# Patient Record
Sex: FEMALE | Race: WHITE | NOT HISPANIC OR LATINO
[De-identification: names, ages, dates, MRNs, and addresses within clinical notes are randomized per-mention and may not be internally consistent; named-entity substitution may affect disease eponyms.]

---

## 2018-09-03 ENCOUNTER — CHARTING TRANS (OUTPATIENT)
Dept: OTHER | Age: 33
End: 2018-09-03

## 2018-09-03 ASSESSMENT — PAIN SCALES - GENERAL: PAINLEVEL_OUTOF10: 6

## 2018-09-04 ENCOUNTER — LAB SERVICES (OUTPATIENT)
Dept: OTHER | Age: 33
End: 2018-09-04

## 2018-09-07 LAB — BACTERIA UR CULT: NORMAL

## 2018-09-17 ENCOUNTER — LAB SERVICES (OUTPATIENT)
Dept: OTHER | Age: 33
End: 2018-09-17

## 2018-09-17 ENCOUNTER — CHARTING TRANS (OUTPATIENT)
Dept: OTHER | Age: 33
End: 2018-09-17

## 2018-09-17 ENCOUNTER — MYAURORA ACCOUNT LINK (OUTPATIENT)
Dept: OTHER | Age: 33
End: 2018-09-17

## 2018-09-19 ENCOUNTER — CHARTING TRANS (OUTPATIENT)
Dept: OTHER | Age: 33
End: 2018-09-19

## 2018-09-19 LAB
AMPHETAMINES UR QL SCN>500 NG/ML: NEGATIVE
BARBITURATES UR QL SCN>200 NG/ML: NEGATIVE
BENZODIAZ UR QL SCN>200 NG/ML: NEGATIVE
BZE UR QL SCN>150 NG/ML: NEGATIVE
CANNABINOIDS UR QL SCN>50 NG/ML: NEGATIVE
OPIATES UR QL SCN>300 NG/ML: NEGATIVE
PCP UR QL SCN>25 NG/ML: NEGATIVE

## 2018-09-20 ENCOUNTER — CHARTING TRANS (OUTPATIENT)
Dept: OTHER | Age: 33
End: 2018-09-20

## 2018-09-20 LAB
25(OH)D3+25(OH)D2 SERPL-MCNC: 20.3 NG/ML (ref 30–100)
ALBUMIN SERPL-MCNC: 4 G/DL (ref 3.6–5.1)
ALBUMIN/GLOB SERPL: 1.4 (ref 1–2.4)
ALP SERPL-CCNC: 55 UNITS/L (ref 45–117)
ALT SERPL-CCNC: 19 UNITS/L
ANION GAP SERPL CALC-SCNC: 12 MMOL/L (ref 10–20)
ANNOTATION COMMENT IMP: NORMAL
AST SERPL-CCNC: 17 UNITS/L
BASOPHILS # BLD: 0 K/MCL (ref 0–0.3)
BASOPHILS NFR BLD: 1 %
BILIRUB SERPL-MCNC: 0.4 MG/DL (ref 0.2–1)
BUN SERPL-MCNC: 12 MG/DL (ref 6–20)
BUN/CREAT SERPL: 21 (ref 7–25)
CALCIUM SERPL-MCNC: 9.1 MG/DL (ref 8.4–10.2)
CHLORIDE SERPL-SCNC: 110 MMOL/L (ref 98–107)
CHOLEST SERPL-MCNC: 101 MG/DL
CHOLEST/HDLC SERPL: 1.6
CO2 SERPL-SCNC: 24 MMOL/L (ref 21–32)
CREAT SERPL-MCNC: 0.58 MG/DL (ref 0.51–0.95)
DIFFERENTIAL METHOD BLD: ABNORMAL
EOSINOPHIL # BLD: 0.1 K/MCL (ref 0.1–0.5)
EOSINOPHIL NFR BLD: 2 %
ERYTHROCYTE [DISTWIDTH] IN BLOOD: 12.8 % (ref 11–15)
FOLATE SERPL-MCNC: 17.5 NG/ML
GLOBULIN SER-MCNC: 2.9 G/DL (ref 2–4)
GLUCOSE SERPL-MCNC: 73 MG/DL (ref 65–99)
HBA1C MFR BLD: 5 % (ref 4.5–5.6)
HBV CORE IGG+IGM SER QL: NEGATIVE
HBV SURFACE AB SER QL: NEGATIVE
HBV SURFACE AG SER QL: NEGATIVE
HCV AB SER QL: NEGATIVE
HDLC SERPL-MCNC: 62 MG/DL
HEMATOCRIT: 41.2 % (ref 36–46.5)
HEMOGLOBIN: 12.6 G/DL (ref 12–15.5)
IMM GRANULOCYTES # BLD AUTO: 0 K/MCL (ref 0–0.2)
IMM GRANULOCYTES NFR BLD: 0 %
LDLC SERPL CALC-MCNC: 29 MG/DL
LENGTH OF FAST TIME PATIENT: ABNORMAL HRS
LENGTH OF FAST TIME PATIENT: ABNORMAL HRS
LYMPHOCYTES # BLD: 1.9 K/MCL (ref 1–4.8)
LYMPHOCYTES NFR BLD: 36 %
MAGNESIUM SERPL-MCNC: 2.3 MG/DL (ref 1.7–2.4)
MEAN CORPUSCULAR HEMOGLOBIN: 29.6 PG (ref 26–34)
MEAN CORPUSCULAR HGB CONC: 30.6 G/DL (ref 32–36.5)
MEAN CORPUSCULAR VOLUME: 96.9 FL (ref 78–100)
MONOCYTES # BLD: 0.4 K/MCL (ref 0.3–0.9)
MONOCYTES NFR BLD: 7 %
NEUTROPHILS # BLD: 3 K/MCL (ref 1.8–7.7)
NEUTROPHILS NFR BLD: 54 %
NONHDLC SERPL-MCNC: 39 MG/DL
NRBC (NRBCRE): 0 /100 WBC
PLATELET COUNT: 217 K/MCL (ref 140–450)
POTASSIUM SERPL-SCNC: 4.7 MMOL/L (ref 3.4–5.1)
RED CELL COUNT: 4.25 MIL/MCL (ref 4–5.2)
SODIUM SERPL-SCNC: 141 MMOL/L (ref 135–145)
TOTAL PROTEIN: 6.9 G/DL (ref 6.4–8.2)
TRIGL SERPL-MCNC: 48 MG/DL
TSH SERPL-ACNC: 1.99 MCUNITS/ML (ref 0.35–5)
VIT B12 SERPL-MCNC: 452 PG/ML (ref 211–911)
WHITE BLOOD COUNT: 5.4 K/MCL (ref 4.2–11)

## 2018-10-02 ENCOUNTER — LAB SERVICES (OUTPATIENT)
Dept: OTHER | Age: 33
End: 2018-10-02

## 2018-10-02 ENCOUNTER — BH HISTORICAL (OUTPATIENT)
Dept: OTHER | Age: 33
End: 2018-10-02

## 2018-10-02 ENCOUNTER — CHARTING TRANS (OUTPATIENT)
Dept: OTHER | Age: 33
End: 2018-10-02

## 2018-10-04 LAB — LITHIUM SERPL-SCNC: 0.2 MMOL/L (ref 0.6–1.2)

## 2018-10-15 ENCOUNTER — MYAURORA ACCOUNT LINK (OUTPATIENT)
Dept: OTHER | Age: 33
End: 2018-10-15

## 2018-10-15 ENCOUNTER — BH HISTORICAL (OUTPATIENT)
Dept: OTHER | Age: 33
End: 2018-10-15

## 2018-11-18 RX ORDER — SERTRALINE HYDROCHLORIDE 100 MG/1
TABLET, FILM COATED ORAL
COMMUNITY
End: 2019-08-27 | Stop reason: ALTCHOICE

## 2018-11-18 RX ORDER — LEVOTHYROXINE SODIUM 0.03 MG/1
TABLET ORAL
COMMUNITY
End: 2019-08-27 | Stop reason: ALTCHOICE

## 2018-11-27 VITALS
SYSTOLIC BLOOD PRESSURE: 100 MMHG | WEIGHT: 121.92 LBS | TEMPERATURE: 98.4 F | BODY MASS INDEX: 19.59 KG/M2 | DIASTOLIC BLOOD PRESSURE: 65 MMHG | HEART RATE: 62 BPM | RESPIRATION RATE: 20 BRPM | HEIGHT: 66 IN

## 2018-11-27 VITALS
SYSTOLIC BLOOD PRESSURE: 104 MMHG | HEIGHT: 66 IN | WEIGHT: 124 LBS | BODY MASS INDEX: 19.93 KG/M2 | DIASTOLIC BLOOD PRESSURE: 62 MMHG

## 2018-11-27 VITALS
OXYGEN SATURATION: 99 % | BODY MASS INDEX: 20.04 KG/M2 | WEIGHT: 124.67 LBS | SYSTOLIC BLOOD PRESSURE: 104 MMHG | HEART RATE: 86 BPM | DIASTOLIC BLOOD PRESSURE: 64 MMHG | HEIGHT: 66 IN

## 2018-12-03 ENCOUNTER — BEHAVIORAL HEALTH (OUTPATIENT)
Dept: BEHAVIORAL HEALTH | Age: 33
End: 2018-12-03

## 2018-12-03 DIAGNOSIS — F31.81 BIPOLAR II DISORDER (CMD): Primary | ICD-10-CM

## 2018-12-03 PROCEDURE — 90791 PSYCH DIAGNOSTIC EVALUATION: CPT | Performed by: PSYCHOLOGIST

## 2018-12-04 SDOH — SOCIAL STABILITY: SOCIAL INSECURITY: WITHIN THE LAST YEAR, HAVE YOU BEEN HUMILIATED OR EMOTIONALLY ABUSED IN OTHER WAYS BY YOUR PARTNER OR EX-PARTNER?: NO

## 2018-12-04 SDOH — SOCIAL STABILITY: SOCIAL INSECURITY
WITHIN THE LAST YEAR, HAVE TO BEEN RAPED OR FORCED TO HAVE ANY KIND OF SEXUAL ACTIVITY BY YOUR PARTNER OR EX-PARTNER?: NO

## 2018-12-04 SDOH — SOCIAL STABILITY: SOCIAL INSECURITY
WITHIN THE LAST YEAR, HAVE YOU BEEN KICKED, HIT, SLAPPED, OR OTHERWISE PHYSICALLY HURT BY YOUR PARTNER OR EX-PARTNER?: NO

## 2018-12-04 SDOH — SOCIAL STABILITY: SOCIAL INSECURITY: WITHIN THE LAST YEAR, HAVE YOU BEEN AFRAID OF YOUR PARTNER OR EX-PARTNER?: NO

## 2018-12-04 SDOH — SOCIAL STABILITY: SOCIAL NETWORK: ARE YOU MARRIED, WIDOWED, DIVORCED, SEPARATED, NEVER MARRIED, OR LIVING WITH A PARTNER?: MARRIED

## 2018-12-04 ASSESSMENT — PATIENT HEALTH QUESTIONNAIRE - PHQ9
2. FEELING DOWN, DEPRESSED OR HOPELESS: SEVERAL DAYS
5. POOR APPETITE, WEIGHT LOSS, OR OVEREATING: SEVERAL DAYS
1. LITTLE INTEREST OR PLEASURE IN DOING THINGS: SEVERAL DAYS
8. MOVING OR SPEAKING SO SLOWLY THAT OTHER PEOPLE COULD HAVE NOTICED. OR THE OPPOSITE, BEING SO FIGETY OR RESTLESS THAT YOU HAVE BEEN MOVING AROUND A LOT MORE THAN USUAL: NOT AT ALL
7. TROUBLE CONCENTRATING ON THINGS, SUCH AS READING THE NEWSPAPER OR WATCHING TELEVISION: SEVERAL DAYS
SUM OF ALL RESPONSES TO PHQ9 QUESTIONS 1 AND 2: 2
6. FEELING BAD ABOUT YOURSELF - OR THAT YOU ARE A FAILURE OR HAVE LET YOURSELF OR YOUR FAMILY DOWN: MORE THAN HALF THE DAYS
SUM OF ALL RESPONSES TO PHQ QUESTIONS 1-9: 9
9. THOUGHTS THAT YOU WOULD BE BETTER OFF DEAD, OR OF HURTING YOURSELF: SEVERAL DAYS
CLINICAL INTERPRETATION OF PHQ9 SCORE: MILD DEPRESSION
10. IF YOU CHECKED OFF ANY PROBLEMS, HOW DIFFICULT HAVE THESE PROBLEMS MADE IT FOR YOU TO DO YOUR WORK, TAKE CARE OF THINGS AT HOME, OR GET ALONG WITH OTHER PEOPLE: SOMEWHAT DIFFICULT
3. TROUBLE FALLING OR STAYING ASLEEP OR SLEEPING TOO MUCH: SEVERAL DAYS
4. FEELING TIRED OR HAVING LITTLE ENERGY: SEVERAL DAYS

## 2018-12-04 ASSESSMENT — ANXIETY QUESTIONNAIRES
4. TROUBLE RELAXING: 2
6. BECOMING EASILY ANNOYED OR IRRITABLE: 1
1. FEELING NERVOUS, ANXIOUS, OR ON EDGE: 3
6. BECOMING EASILY ANNOYED OR IRRITABLE: SEVERAL DAYS
1. FEELING NERVOUS, ANXIOUS, OR ON EDGE: NEARLY EVERY DAY
3. WORRYING TOO MUCH ABOUT DIFFERENT THINGS: MORE THAN HALF THE DAYS
GAD7 TOTAL SCORE: 11
7. FEELING AFRAID AS IF SOMETHING AWFUL MIGHT HAPPEN: SEVERAL DAYS
2. NOT BEING ABLE TO STOP OR CONTROL WORRYING: 2
5. BEING SO RESTLESS THAT IT IS HARD TO SIT STILL: 0
7. FEELING AFRAID AS IF SOMETHING AWFUL MIGHT HAPPEN: 1
2. NOT BEING ABLE TO STOP OR CONTROL WORRYING: MORE THAN HALF THE DAYS
5. BEING SO RESTLESS THAT IT IS HARD TO SIT STILL: NOT AT ALL
3. WORRYING TOO MUCH ABOUT DIFFERENT THINGS: 2
4. TROUBLE RELAXING: MORE THAN HALF THE DAYS

## 2018-12-25 PROBLEM — F31.9 BIPOLAR DEPRESSION (CMD): Status: ACTIVE | Noted: 2018-09-17

## 2018-12-25 PROBLEM — F12.90 MARIJUANA USE: Status: ACTIVE | Noted: 2018-09-17

## 2018-12-25 PROBLEM — F14.11 COCAINE ABUSE IN REMISSION (CMD): Status: ACTIVE | Noted: 2018-09-17

## 2018-12-27 ENCOUNTER — OFFICE VISIT (OUTPATIENT)
Dept: OBGYN | Age: 33
End: 2018-12-27

## 2018-12-27 DIAGNOSIS — Z11.3 SCREEN FOR STD (SEXUALLY TRANSMITTED DISEASE): ICD-10-CM

## 2018-12-27 DIAGNOSIS — Z30.432 ENCOUNTER FOR IUD REMOVAL: ICD-10-CM

## 2018-12-27 DIAGNOSIS — Z01.419 ENCOUNTER FOR CERVICAL PAP SMEAR WITH PELVIC EXAM: Primary | ICD-10-CM

## 2018-12-27 DIAGNOSIS — Z31.69 ENCOUNTER FOR PRECONCEPTION CONSULTATION: ICD-10-CM

## 2018-12-27 PROCEDURE — 87491 CHLMYD TRACH DNA AMP PROBE: CPT | Performed by: OBSTETRICS & GYNECOLOGY

## 2018-12-27 PROCEDURE — 99385 PREV VISIT NEW AGE 18-39: CPT | Performed by: OBSTETRICS & GYNECOLOGY

## 2018-12-27 PROCEDURE — 87591 N.GONORRHOEAE DNA AMP PROB: CPT | Performed by: OBSTETRICS & GYNECOLOGY

## 2018-12-27 PROCEDURE — 58301 REMOVE INTRAUTERINE DEVICE: CPT | Performed by: OBSTETRICS & GYNECOLOGY

## 2018-12-27 RX ORDER — LEVOTHYROXINE SODIUM 0.03 MG/1
TABLET ORAL
COMMUNITY
Start: 2018-04-19 | End: 2019-08-27 | Stop reason: ALTCHOICE

## 2018-12-27 SDOH — ECONOMIC STABILITY: FOOD INSECURITY: WITHIN THE PAST 12 MONTHS, YOU WORRIED THAT YOUR FOOD WOULD RUN OUT BEFORE YOU GOT MONEY TO BUY MORE.: NEVER TRUE

## 2018-12-27 SDOH — ECONOMIC STABILITY: TRANSPORTATION INSECURITY
IN THE PAST 12 MONTHS, HAS LACK OF TRANSPORTATION KEPT YOU FROM MEETINGS, WORK, OR FROM GETTING THINGS NEEDED FOR DAILY LIVING?: NO

## 2018-12-27 SDOH — ECONOMIC STABILITY: FOOD INSECURITY: WITHIN THE PAST 12 MONTHS, THE FOOD YOU BOUGHT JUST DIDN'T LAST AND YOU DIDN'T HAVE MONEY TO GET MORE.: NEVER TRUE

## 2018-12-27 SDOH — ECONOMIC STABILITY: INCOME INSECURITY: HOW HARD IS IT FOR YOU TO PAY FOR THE VERY BASICS LIKE FOOD, HOUSING, MEDICAL CARE, AND HEATING?: NOT HARD AT ALL

## 2018-12-27 SDOH — ECONOMIC STABILITY: TRANSPORTATION INSECURITY
IN THE PAST 12 MONTHS, HAS THE LACK OF TRANSPORTATION KEPT YOU FROM MEDICAL APPOINTMENTS OR FROM GETTING MEDICATIONS?: NO

## 2018-12-27 ASSESSMENT — PATIENT HEALTH QUESTIONNAIRE - PHQ9
1. LITTLE INTEREST OR PLEASURE IN DOING THINGS: SEVERAL DAYS
SUM OF ALL RESPONSES TO PHQ9 QUESTIONS 1 AND 2: 2
2. FEELING DOWN, DEPRESSED OR HOPELESS: SEVERAL DAYS

## 2018-12-27 ASSESSMENT — PAIN SCALES - GENERAL: PAINLEVEL: 0

## 2018-12-31 LAB
C TRACH RRNA SPEC QL NAA+PROBE: NEGATIVE
N GONORRHOEA RRNA SPEC QL NAA+PROBE: NEGATIVE
SPECIMEN SOURCE: NORMAL

## 2019-01-04 LAB — HPV16+18+45 E6+E7MRNA CVX NAA+PROBE: NORMAL

## 2019-01-22 ENCOUNTER — APPOINTMENT (OUTPATIENT)
Dept: BEHAVIORAL HEALTH | Age: 34
End: 2019-01-22

## 2019-02-07 ENCOUNTER — BEHAVIORAL HEALTH (OUTPATIENT)
Dept: BEHAVIORAL HEALTH | Age: 34
End: 2019-02-07

## 2019-02-07 DIAGNOSIS — F31.81 BIPOLAR II DISORDER (CMD): Primary | ICD-10-CM

## 2019-02-07 PROCEDURE — 90837 PSYTX W PT 60 MINUTES: CPT | Performed by: PSYCHOLOGIST

## 2019-03-01 ENCOUNTER — BEHAVIORAL HEALTH (OUTPATIENT)
Dept: BEHAVIORAL HEALTH | Age: 34
End: 2019-03-01

## 2019-03-01 DIAGNOSIS — F31.81 BIPOLAR II DISORDER (CMD): Primary | ICD-10-CM

## 2019-03-01 PROCEDURE — 90837 PSYTX W PT 60 MINUTES: CPT | Performed by: PSYCHOLOGIST

## 2019-03-11 ENCOUNTER — APPOINTMENT (OUTPATIENT)
Dept: BEHAVIORAL HEALTH | Age: 34
End: 2019-03-11

## 2019-03-19 ENCOUNTER — TELEPHONE (OUTPATIENT)
Dept: OBGYN | Age: 34
End: 2019-03-19

## 2019-03-19 DIAGNOSIS — Z31.69 ENCOUNTER FOR PRECONCEPTION CONSULTATION: Primary | ICD-10-CM

## 2019-03-28 ENCOUNTER — APPOINTMENT (OUTPATIENT)
Dept: BEHAVIORAL HEALTH | Age: 34
End: 2019-03-28

## 2019-04-11 ENCOUNTER — BEHAVIORAL HEALTH (OUTPATIENT)
Dept: BEHAVIORAL HEALTH | Age: 34
End: 2019-04-11

## 2019-04-11 ENCOUNTER — LAB SERVICES (OUTPATIENT)
Dept: LAB | Age: 34
End: 2019-04-11

## 2019-04-11 DIAGNOSIS — Z31.69 ENCOUNTER FOR PRECONCEPTION CONSULTATION: ICD-10-CM

## 2019-04-11 DIAGNOSIS — F31.81 BIPOLAR II DISORDER (CMD): Primary | ICD-10-CM

## 2019-04-11 PROCEDURE — 87389 HIV-1 AG W/HIV-1&-2 AB AG IA: CPT | Performed by: OBSTETRICS & GYNECOLOGY

## 2019-04-11 PROCEDURE — 86592 SYPHILIS TEST NON-TREP QUAL: CPT | Performed by: OBSTETRICS & GYNECOLOGY

## 2019-04-11 PROCEDURE — 90837 PSYTX W PT 60 MINUTES: CPT | Performed by: PSYCHOLOGIST

## 2019-04-11 PROCEDURE — 36415 COLL VENOUS BLD VENIPUNCTURE: CPT | Performed by: OBSTETRICS & GYNECOLOGY

## 2019-04-11 PROCEDURE — 86803 HEPATITIS C AB TEST: CPT | Performed by: OBSTETRICS & GYNECOLOGY

## 2019-04-11 PROCEDURE — 87340 HEPATITIS B SURFACE AG IA: CPT | Performed by: OBSTETRICS & GYNECOLOGY

## 2019-04-12 LAB
HBV SURFACE AG SER QL: NEGATIVE
HCV AB SER QL: NEGATIVE
HIV 1+2 AB+HIV1 P24 AG SERPL QL IA: NONREACTIVE
RPR SER QL: NONREACTIVE

## 2019-05-07 ENCOUNTER — OFFICE VISIT (OUTPATIENT)
Dept: OBGYN | Age: 34
End: 2019-05-07

## 2019-05-07 DIAGNOSIS — E03.2 HYPOTHYROIDISM, LITHIUM INDUCED: ICD-10-CM

## 2019-05-07 DIAGNOSIS — N94.6 DYSMENORRHEA: Primary | ICD-10-CM

## 2019-05-07 DIAGNOSIS — T56.891A HYPOTHYROIDISM, LITHIUM INDUCED: ICD-10-CM

## 2019-05-07 PROCEDURE — 99213 OFFICE O/P EST LOW 20 MIN: CPT | Performed by: OBSTETRICS & GYNECOLOGY

## 2019-05-07 RX ORDER — IBUPROFEN 600 MG/1
600 TABLET ORAL EVERY 6 HOURS PRN
Qty: 30 TABLET | Refills: 0 | Status: SHIPPED | OUTPATIENT
Start: 2019-05-07 | End: 2019-08-27 | Stop reason: ALTCHOICE

## 2019-05-07 ASSESSMENT — ENCOUNTER SYMPTOMS
AGITATION: 0
EYE PAIN: 0
CHILLS: 1
DIARRHEA: 1
VOMITING: 0
CONSTIPATION: 0
SORE THROAT: 0
RHINORRHEA: 0
NAUSEA: 0
ADENOPATHY: 0
COUGH: 0
BRUISES/BLEEDS EASILY: 0
BACK PAIN: 0
SHORTNESS OF BREATH: 0
ABDOMINAL PAIN: 1
HALLUCINATIONS: 0
WHEEZING: 0
FEVER: 0
CONFUSION: 0
UNEXPECTED WEIGHT CHANGE: 0

## 2019-05-07 ASSESSMENT — PAIN SCALES - GENERAL: PAINLEVEL: 0

## 2019-05-23 ENCOUNTER — LAB SERVICES (OUTPATIENT)
Dept: LAB | Age: 34
End: 2019-05-23

## 2019-05-23 ENCOUNTER — BEHAVIORAL HEALTH (OUTPATIENT)
Dept: BEHAVIORAL HEALTH | Age: 34
End: 2019-05-23

## 2019-05-23 DIAGNOSIS — F31.81 BIPOLAR II DISORDER (CMD): Primary | ICD-10-CM

## 2019-05-23 LAB — TSH SERPL-ACNC: 1.35 MCUNITS/ML (ref 0.35–5)

## 2019-05-23 PROCEDURE — 36415 COLL VENOUS BLD VENIPUNCTURE: CPT | Performed by: OBSTETRICS & GYNECOLOGY

## 2019-05-23 PROCEDURE — 90837 PSYTX W PT 60 MINUTES: CPT | Performed by: PSYCHOLOGIST

## 2019-05-23 PROCEDURE — 84443 ASSAY THYROID STIM HORMONE: CPT | Performed by: OBSTETRICS & GYNECOLOGY

## 2019-05-31 ENCOUNTER — TELEPHONE (OUTPATIENT)
Dept: BEHAVIORAL HEALTH | Age: 34
End: 2019-05-31

## 2019-06-06 ENCOUNTER — BEHAVIORAL HEALTH (OUTPATIENT)
Dept: BEHAVIORAL HEALTH | Age: 34
End: 2019-06-06

## 2019-06-06 DIAGNOSIS — F40.10 SOCIAL ANXIETY DISORDER: ICD-10-CM

## 2019-06-06 DIAGNOSIS — F31.81 BIPOLAR II DISORDER (CMD): Primary | ICD-10-CM

## 2019-06-06 PROCEDURE — 90837 PSYTX W PT 60 MINUTES: CPT | Performed by: PSYCHOLOGIST

## 2019-06-24 ENCOUNTER — E-ADVICE (OUTPATIENT)
Dept: OBGYN | Age: 34
End: 2019-06-24

## 2019-07-15 ENCOUNTER — APPOINTMENT (OUTPATIENT)
Dept: BEHAVIORAL HEALTH | Age: 34
End: 2019-07-15

## 2019-08-05 ENCOUNTER — BEHAVIORAL HEALTH (OUTPATIENT)
Dept: BEHAVIORAL HEALTH | Age: 34
End: 2019-08-05

## 2019-08-05 DIAGNOSIS — F40.10 SOCIAL ANXIETY DISORDER: ICD-10-CM

## 2019-08-05 DIAGNOSIS — F31.81 BIPOLAR II DISORDER (CMD): Primary | ICD-10-CM

## 2019-08-05 PROCEDURE — 90837 PSYTX W PT 60 MINUTES: CPT | Performed by: PSYCHOLOGIST

## 2019-08-06 ENCOUNTER — APPOINTMENT (OUTPATIENT)
Dept: OBGYN | Age: 34
End: 2019-08-06

## 2019-08-26 ENCOUNTER — BEHAVIORAL HEALTH (OUTPATIENT)
Dept: BEHAVIORAL HEALTH | Age: 34
End: 2019-08-26

## 2019-08-26 DIAGNOSIS — F31.81 BIPOLAR II DISORDER (CMD): Primary | ICD-10-CM

## 2019-08-26 PROCEDURE — 90834 PSYTX W PT 45 MINUTES: CPT | Performed by: PSYCHOLOGIST

## 2019-08-27 ENCOUNTER — OFFICE VISIT (OUTPATIENT)
Dept: OBGYN | Age: 34
End: 2019-08-27

## 2019-08-27 ENCOUNTER — LAB SERVICES (OUTPATIENT)
Dept: LAB | Age: 34
End: 2019-08-27

## 2019-08-27 DIAGNOSIS — O21.9 NAUSEA AND VOMITING OF PREGNANCY, ANTEPARTUM: Primary | ICD-10-CM

## 2019-08-27 DIAGNOSIS — E03.2 HYPOTHYROIDISM, LITHIUM INDUCED: ICD-10-CM

## 2019-08-27 DIAGNOSIS — T56.891A HYPOTHYROIDISM, LITHIUM INDUCED: ICD-10-CM

## 2019-08-27 PROBLEM — F12.90 MARIJUANA USE: Status: RESOLVED | Noted: 2018-09-17 | Resolved: 2019-08-27

## 2019-08-27 PROBLEM — F14.11 COCAINE ABUSE IN REMISSION (CMD): Status: RESOLVED | Noted: 2018-09-17 | Resolved: 2019-08-27

## 2019-08-27 PROBLEM — N94.6 DYSMENORRHEA: Status: RESOLVED | Noted: 2019-05-07 | Resolved: 2019-08-27

## 2019-08-27 LAB
T4 FREE SERPL-MCNC: 1 NG/DL (ref 0.8–1.5)
TSH SERPL-ACNC: 1.07 MCUNITS/ML (ref 0.35–5)

## 2019-08-27 PROCEDURE — 84439 ASSAY OF FREE THYROXINE: CPT | Performed by: OBSTETRICS & GYNECOLOGY

## 2019-08-27 PROCEDURE — 99213 OFFICE O/P EST LOW 20 MIN: CPT | Performed by: OBSTETRICS & GYNECOLOGY

## 2019-08-27 PROCEDURE — 36415 COLL VENOUS BLD VENIPUNCTURE: CPT | Performed by: OBSTETRICS & GYNECOLOGY

## 2019-08-27 PROCEDURE — 84443 ASSAY THYROID STIM HORMONE: CPT | Performed by: OBSTETRICS & GYNECOLOGY

## 2019-08-27 RX ORDER — PYRIDOXINE HCL (VITAMIN B6) 25 MG
25 TABLET ORAL EVERY 6 HOURS
Qty: 180 TABLET | Refills: 1 | Status: SHIPPED | OUTPATIENT
Start: 2019-08-27 | End: 2022-12-20 | Stop reason: ALTCHOICE

## 2019-08-27 ASSESSMENT — PAIN SCALES - GENERAL: PAINLEVEL: 0

## 2019-08-28 ENCOUNTER — TELEPHONE (OUTPATIENT)
Dept: SCHEDULING | Age: 34
End: 2019-08-28

## 2019-09-10 ENCOUNTER — APPOINTMENT (OUTPATIENT)
Dept: BEHAVIORAL HEALTH | Age: 34
End: 2019-09-10

## 2019-09-24 ENCOUNTER — APPOINTMENT (OUTPATIENT)
Dept: BEHAVIORAL HEALTH | Age: 34
End: 2019-09-24

## 2021-05-26 VITALS
SYSTOLIC BLOOD PRESSURE: 106 MMHG | BODY MASS INDEX: 19.49 KG/M2 | WEIGHT: 123.46 LBS | SYSTOLIC BLOOD PRESSURE: 110 MMHG | HEIGHT: 66 IN | TEMPERATURE: 98 F | SYSTOLIC BLOOD PRESSURE: 94 MMHG | DIASTOLIC BLOOD PRESSURE: 64 MMHG | DIASTOLIC BLOOD PRESSURE: 58 MMHG | HEIGHT: 66 IN | BODY MASS INDEX: 19.84 KG/M2 | WEIGHT: 121.25 LBS | HEART RATE: 95 BPM | HEART RATE: 76 BPM | DIASTOLIC BLOOD PRESSURE: 65 MMHG | HEIGHT: 66 IN | TEMPERATURE: 98.2 F | HEART RATE: 104 BPM | BODY MASS INDEX: 19.57 KG/M2

## 2021-12-16 ENCOUNTER — LAB REQUISITION (OUTPATIENT)
Dept: LAB | Age: 36
End: 2021-12-16

## 2021-12-16 DIAGNOSIS — Z00.00 ENCOUNTER FOR GENERAL ADULT MEDICAL EXAMINATION WITHOUT ABNORMAL FINDINGS: ICD-10-CM

## 2021-12-16 PROCEDURE — 86787 VARICELLA-ZOSTER ANTIBODY: CPT | Performed by: CLINICAL MEDICAL LABORATORY

## 2021-12-16 PROCEDURE — PSEU8106 HEPATITIS B SURFACE ANTIBODY, QUANTITATIVE: Performed by: CLINICAL MEDICAL LABORATORY

## 2021-12-16 PROCEDURE — PSEU8230 VARICELLA ZOSTER IMMUNITY IGG: Performed by: CLINICAL MEDICAL LABORATORY

## 2021-12-16 PROCEDURE — 86480 TB TEST CELL IMMUN MEASURE: CPT | Performed by: CLINICAL MEDICAL LABORATORY

## 2021-12-16 PROCEDURE — PSEU9049 QUANTIFERON TB PLUS: Performed by: CLINICAL MEDICAL LABORATORY

## 2021-12-16 PROCEDURE — 86706 HEP B SURFACE ANTIBODY: CPT | Performed by: CLINICAL MEDICAL LABORATORY

## 2021-12-17 LAB — HBV SURFACE AB SER-ACNC: <3.1 MUNITS/ML

## 2021-12-18 LAB
GAMMA INTERFERON BACKGROUND BLD IA-ACNC: 0.02 IU/ML
M TB IFN-G BLD-IMP: NEGATIVE
M TB IFN-G CD4+ BCKGRND COR BLD-ACNC: 0.01 IU/ML
M TB IFN-G CD4+CD8+ BCKGRND COR BLD-ACNC: 0 IU/ML
MITOGEN IGNF BCKGRD COR BLD-ACNC: >10 IU/ML

## 2021-12-20 LAB — VZV IGG SER IA-ACNC: NORMAL

## 2021-12-29 ENCOUNTER — TELEPHONE (OUTPATIENT)
Dept: SCHEDULING | Age: 36
End: 2021-12-29

## 2022-05-11 ENCOUNTER — EMPLOYEE HEALTH (OUTPATIENT)
Dept: OTHER | Age: 37
End: 2022-05-11

## 2022-05-11 ENCOUNTER — HOSPITAL ENCOUNTER (OUTPATIENT)
Dept: LAB | Age: 37
Discharge: STILL ADMITTED OR ADMINISTRATIVE | End: 2022-05-11

## 2022-05-11 DIAGNOSIS — Z11.52 ENCOUNTER FOR SCREENING LABORATORY TESTING FOR COVID-19 VIRUS: ICD-10-CM

## 2022-05-11 DIAGNOSIS — Z11.59 SCREENING FOR VIRAL DISEASE: Primary | ICD-10-CM

## 2022-05-11 LAB
SARS-COV-2 RNA RESP QL NAA+PROBE: NOT DETECTED
SERVICE CMNT-IMP: NORMAL
SERVICE CMNT-IMP: NORMAL

## 2022-05-11 PROCEDURE — U0005 INFEC AGEN DETEC AMPLI PROBE: HCPCS

## 2022-05-11 PROCEDURE — U0005 INFEC AGEN DETEC AMPLI PROBE: HCPCS | Performed by: INTERNAL MEDICINE

## 2022-05-11 PROCEDURE — U0003 INFECTIOUS AGENT DETECTION BY NUCLEIC ACID (DNA OR RNA); SEVERE ACUTE RESPIRATORY SYNDROME CORONAVIRUS 2 (SARS-COV-2) (CORONAVIRUS DISEASE [COVID-19]), AMPLIFIED PROBE TECHNIQUE, MAKING USE OF HIGH THROUGHPUT TECHNOLOGIES AS DESCRIBED BY CMS-2020-01-R: HCPCS | Performed by: INTERNAL MEDICINE

## 2022-05-16 ENCOUNTER — EMPLOYEE HEALTH (OUTPATIENT)
Dept: OTHER | Age: 37
End: 2022-05-16

## 2022-05-26 ENCOUNTER — EMPLOYEE HEALTH (OUTPATIENT)
Dept: OTHER | Age: 37
End: 2022-05-26

## 2022-08-31 ENCOUNTER — TELEPHONE (OUTPATIENT)
Dept: URGENT CARE | Age: 37
End: 2022-08-31

## 2022-08-31 ENCOUNTER — WALK IN (OUTPATIENT)
Dept: URGENT CARE | Age: 37
End: 2022-08-31

## 2022-08-31 VITALS
RESPIRATION RATE: 17 BRPM | WEIGHT: 132.28 LBS | OXYGEN SATURATION: 98 % | TEMPERATURE: 98.6 F | DIASTOLIC BLOOD PRESSURE: 59 MMHG | HEIGHT: 66 IN | HEART RATE: 75 BPM | BODY MASS INDEX: 21.26 KG/M2 | SYSTOLIC BLOOD PRESSURE: 94 MMHG

## 2022-08-31 DIAGNOSIS — J02.9 SORE THROAT: Primary | ICD-10-CM

## 2022-08-31 DIAGNOSIS — B34.9 VIRAL SYNDROME: ICD-10-CM

## 2022-08-31 LAB
FLUAV RNA RESP QL NAA+PROBE: NOT DETECTED
FLUBV RNA RESP QL NAA+PROBE: NOT DETECTED
RSV AG NPH QL IA.RAPID: NOT DETECTED
S PYO DNA THROAT QL NAA+PROBE: NOT DETECTED
SARS-COV-2 RNA RESP QL NAA+PROBE: NOT DETECTED

## 2022-08-31 PROCEDURE — 99213 OFFICE O/P EST LOW 20 MIN: CPT | Performed by: PHYSICIAN ASSISTANT

## 2022-08-31 PROCEDURE — 87651 STREP A DNA AMP PROBE: CPT | Performed by: PHYSICIAN ASSISTANT

## 2022-08-31 PROCEDURE — 0241U POCT COVID/FLU/RSV PANEL: CPT | Performed by: PHYSICIAN ASSISTANT

## 2022-08-31 RX ORDER — BENZONATATE 100 MG/1
100 CAPSULE ORAL 3 TIMES DAILY PRN
Qty: 20 CAPSULE | Refills: 0 | Status: SHIPPED | OUTPATIENT
Start: 2022-08-31 | End: 2022-12-20 | Stop reason: ALTCHOICE

## 2022-08-31 ASSESSMENT — ENCOUNTER SYMPTOMS
SORE THROAT: 1
FATIGUE: 1
FEVER: 0
RHINORRHEA: 0
HEADACHES: 1
TROUBLE SWALLOWING: 0
RESPIRATORY NEGATIVE: 1
GASTROINTESTINAL NEGATIVE: 1

## 2022-09-02 ENCOUNTER — EMPLOYEE HEALTH (OUTPATIENT)
Dept: OTHER | Age: 37
End: 2022-09-02

## 2022-09-03 ENCOUNTER — EMPLOYEE HEALTH (OUTPATIENT)
Dept: OTHER | Age: 37
End: 2022-09-03

## 2022-10-23 ENCOUNTER — EMPLOYEE HEALTH (OUTPATIENT)
Dept: OTHER | Age: 37
End: 2022-10-23

## 2022-10-30 ENCOUNTER — EMPLOYEE HEALTH (OUTPATIENT)
Dept: OTHER | Age: 37
End: 2022-10-30

## 2022-11-16 ENCOUNTER — TELEPHONE (OUTPATIENT)
Dept: SCHEDULING | Age: 37
End: 2022-11-16

## 2022-11-22 ENCOUNTER — TELEPHONE (OUTPATIENT)
Dept: MATERNAL FETAL MEDICINE | Age: 37
End: 2022-11-22

## 2022-12-14 ENCOUNTER — E-ADVICE (OUTPATIENT)
Dept: OBGYN | Age: 37
End: 2022-12-14

## 2022-12-14 DIAGNOSIS — O09.511 PRIMIGRAVIDA OF ADVANCED MATERNAL AGE IN FIRST TRIMESTER: Primary | ICD-10-CM

## 2022-12-20 ENCOUNTER — LAB SERVICES (OUTPATIENT)
Dept: LAB | Age: 37
End: 2022-12-20

## 2022-12-20 ENCOUNTER — TELEPHONE (OUTPATIENT)
Dept: MATERNAL FETAL MEDICINE | Age: 37
End: 2022-12-20

## 2022-12-20 ENCOUNTER — TELEPHONE (OUTPATIENT)
Dept: OBGYN | Age: 37
End: 2022-12-20

## 2022-12-20 ENCOUNTER — OFFICE VISIT (OUTPATIENT)
Dept: FAMILY MEDICINE | Age: 37
End: 2022-12-20

## 2022-12-20 VITALS
HEART RATE: 79 BPM | TEMPERATURE: 98.9 F | WEIGHT: 140.54 LBS | SYSTOLIC BLOOD PRESSURE: 97 MMHG | DIASTOLIC BLOOD PRESSURE: 52 MMHG | BODY MASS INDEX: 22.68 KG/M2 | RESPIRATION RATE: 18 BRPM | OXYGEN SATURATION: 97 %

## 2022-12-20 DIAGNOSIS — Z83.2 FAMILY HISTORY OF AUTOIMMUNE DISORDER: ICD-10-CM

## 2022-12-20 DIAGNOSIS — F32.1 CURRENT MODERATE EPISODE OF MAJOR DEPRESSIVE DISORDER, UNSPECIFIED WHETHER RECURRENT (CMD): ICD-10-CM

## 2022-12-20 DIAGNOSIS — Z76.89 ENCOUNTER TO ESTABLISH CARE: Primary | ICD-10-CM

## 2022-12-20 DIAGNOSIS — L29.8 AQUAGENIC PRURITUS: ICD-10-CM

## 2022-12-20 DIAGNOSIS — Z33.1 PREGNANCY, INCIDENTAL: ICD-10-CM

## 2022-12-20 PROBLEM — F32.A DEPRESSION: Status: ACTIVE | Noted: 2020-01-20

## 2022-12-20 PROBLEM — L29.89 AQUAGENIC PRURITUS: Status: ACTIVE | Noted: 2022-12-20

## 2022-12-20 PROCEDURE — 96127 BRIEF EMOTIONAL/BEHAV ASSMT: CPT | Performed by: STUDENT IN AN ORGANIZED HEALTH CARE EDUCATION/TRAINING PROGRAM

## 2022-12-20 PROCEDURE — 99395 PREV VISIT EST AGE 18-39: CPT | Performed by: STUDENT IN AN ORGANIZED HEALTH CARE EDUCATION/TRAINING PROGRAM

## 2022-12-20 RX ORDER — SERTRALINE HYDROCHLORIDE 100 MG/1
150 TABLET, FILM COATED ORAL DAILY
COMMUNITY
Start: 2022-10-03 | End: 2023-01-24 | Stop reason: SDUPTHER

## 2022-12-20 RX ORDER — DIPHENHYDRAMINE HCL 25 MG
25 CAPSULE ORAL EVERY 6 HOURS PRN
Qty: 90 CAPSULE | Refills: 3 | Status: SHIPPED | OUTPATIENT
Start: 2022-12-20 | End: 2023-04-18

## 2022-12-20 ASSESSMENT — PATIENT HEALTH QUESTIONNAIRE - PHQ9
8. MOVING OR SPEAKING SO SLOWLY THAT OTHER PEOPLE COULD HAVE NOTICED. OR THE OPPOSITE, BEING SO FIGETY OR RESTLESS THAT YOU HAVE BEEN MOVING AROUND A LOT MORE THAN USUAL: NOT AT ALL
5. POOR APPETITE, WEIGHT LOSS, OR OVEREATING: SEVERAL DAYS
SUM OF ALL RESPONSES TO PHQ QUESTIONS 1-9: 10
SUM OF ALL RESPONSES TO PHQ9 QUESTIONS 1 AND 2: 6
4. FEELING TIRED OR HAVING LITTLE ENERGY: SEVERAL DAYS
3. TROUBLE FALLING OR STAYING ASLEEP OR SLEEPING TOO MUCH: SEVERAL DAYS
1. LITTLE INTEREST OR PLEASURE IN DOING THINGS: NEARLY EVERY DAY
SUM OF ALL RESPONSES TO PHQ9 QUESTIONS 1 AND 2: 6
7. TROUBLE CONCENTRATING ON THINGS, SUCH AS READING THE NEWSPAPER OR WATCHING TELEVISION: SEVERAL DAYS
CLINICAL INTERPRETATION OF PHQ2 SCORE: FURTHER SCREENING NEEDED
6. FEELING BAD ABOUT YOURSELF - OR THAT YOU ARE A FAILURE OR HAVE LET YOURSELF OR YOUR FAMILY DOWN: NOT AT ALL
9. THOUGHTS THAT YOU WOULD BE BETTER OFF DEAD, OR OF HURTING YOURSELF: NOT AT ALL
10. IF YOU CHECKED OFF ANY PROBLEMS, HOW DIFFICULT HAVE THESE PROBLEMS MADE IT FOR YOU TO DO YOUR WORK, TAKE CARE OF THINGS AT HOME, OR GET ALONG WITH OTHER PEOPLE: SOMEWHAT DIFFICULT
CLINICAL INTERPRETATION OF PHQ9 SCORE: MODERATE DEPRESSION
2. FEELING DOWN, DEPRESSED OR HOPELESS: NEARLY EVERY DAY

## 2022-12-20 ASSESSMENT — ANXIETY QUESTIONNAIRES
5. BEING SO RESTLESS THAT IT IS HARD TO SIT STILL: SEVERAL DAYS
1. FEELING NERVOUS, ANXIOUS, OR ON EDGE: 1
5. BEING SO RESTLESS THAT IT IS HARD TO SIT STILL: 1
GAD7 TOTAL SCORE: 7
2. NOT BEING ABLE TO STOP OR CONTROL WORRYING: 1
7. FEELING AFRAID AS IF SOMETHING AWFUL MIGHT HAPPEN: 1
3. WORRYING TOO MUCH ABOUT DIFFERENT THINGS: 1
6. BECOMING EASILY ANNOYED OR IRRITABLE: 1
7. FEELING AFRAID AS IF SOMETHING AWFUL MIGHT HAPPEN: SEVERAL DAYS
3. WORRYING TOO MUCH ABOUT DIFFERENT THINGS: SEVERAL DAYS
IF YOU CHECKED OFF ANY PROBLEMS ON THIS QUESTIONNAIRE, HOW DIFFICULT HAVE THESE PROBLEMS MADE IT FOR YOU TO DO YOUR WORK, TAKE CARE OF THINGS AT HOME, OR GET ALONG WITH OTHER PEOPLE: SOMEWHAT DIFFICULT
4. TROUBLE RELAXING: SEVERAL DAYS
6. BECOMING EASILY ANNOYED OR IRRITABLE: SEVERAL DAYS
2. NOT BEING ABLE TO STOP OR CONTROL WORRYING: SEVERAL DAYS
1. FEELING NERVOUS, ANXIOUS, OR ON EDGE: SEVERAL DAYS
4. TROUBLE RELAXING: 1

## 2022-12-20 ASSESSMENT — ENCOUNTER SYMPTOMS
WHEEZING: 0
APPETITE CHANGE: 0
SINUS PAIN: 0
NAUSEA: 1
CONFUSION: 0
DIZZINESS: 0
RHINORRHEA: 0
ANAL BLEEDING: 0
DIARRHEA: 0
BACK PAIN: 0
ABDOMINAL DISTENTION: 0
ACTIVITY CHANGE: 0
FATIGUE: 0
SINUS PRESSURE: 0
HEADACHES: 0
NERVOUS/ANXIOUS: 0
ABDOMINAL PAIN: 0
CONSTIPATION: 0
SHORTNESS OF BREATH: 0
AGITATION: 0
CHILLS: 0
CHEST TIGHTNESS: 0
VOMITING: 0
LIGHT-HEADEDNESS: 0
FEVER: 0
SORE THROAT: 0
COUGH: 0
CHOKING: 0

## 2022-12-20 ASSESSMENT — PAIN SCALES - GENERAL: PAINLEVEL: 0

## 2022-12-21 ENCOUNTER — OFFICE VISIT (OUTPATIENT)
Dept: MATERNAL FETAL MEDICINE | Age: 37
End: 2022-12-21

## 2022-12-21 DIAGNOSIS — O09.521 MULTIGRAVIDA OF ADVANCED MATERNAL AGE IN FIRST TRIMESTER: Primary | ICD-10-CM

## 2022-12-21 DIAGNOSIS — O09.511 PRIMIGRAVIDA OF ADVANCED MATERNAL AGE IN FIRST TRIMESTER: ICD-10-CM

## 2022-12-21 PROCEDURE — 76801 OB US < 14 WKS SINGLE FETUS: CPT | Performed by: OBSTETRICS & GYNECOLOGY

## 2022-12-21 PROCEDURE — 76817 TRANSVAGINAL US OBSTETRIC: CPT | Performed by: OBSTETRICS & GYNECOLOGY

## 2022-12-22 ENCOUNTER — LAB SERVICES (OUTPATIENT)
Dept: LAB | Age: 37
End: 2022-12-22

## 2022-12-22 DIAGNOSIS — Z83.2 FAMILY HISTORY OF AUTOIMMUNE DISORDER: ICD-10-CM

## 2022-12-22 DIAGNOSIS — O09.521 MULTIGRAVIDA OF ADVANCED MATERNAL AGE IN FIRST TRIMESTER: ICD-10-CM

## 2022-12-22 LAB
ALBUMIN SERPL-MCNC: 3.8 G/DL (ref 3.6–5.1)
ALBUMIN/GLOB SERPL: 1.1 {RATIO} (ref 1–2.4)
ALP SERPL-CCNC: 58 UNITS/L (ref 45–117)
ALT SERPL-CCNC: 21 UNITS/L
ANION GAP SERPL CALC-SCNC: 10 MMOL/L (ref 7–19)
AST SERPL-CCNC: 8 UNITS/L
BILIRUB SERPL-MCNC: 0.5 MG/DL (ref 0.2–1)
BUN SERPL-MCNC: 13 MG/DL (ref 6–20)
BUN/CREAT SERPL: 25 (ref 7–25)
CALCIUM SERPL-MCNC: 9.6 MG/DL (ref 8.4–10.2)
CHLORIDE SERPL-SCNC: 105 MMOL/L (ref 97–110)
CO2 SERPL-SCNC: 27 MMOL/L (ref 21–32)
CREAT SERPL-MCNC: 0.51 MG/DL (ref 0.51–0.95)
ERYTHROCYTE [SEDIMENTATION RATE] IN BLOOD BY WESTERGREN METHOD: 7 MM/HR (ref 0–20)
FASTING DURATION TIME PATIENT: NORMAL H
GFR SERPLBLD BASED ON 1.73 SQ M-ARVRAT: >90 ML/MIN
GLOBULIN SER-MCNC: 3.5 G/DL (ref 2–4)
GLUCOSE SERPL-MCNC: 70 MG/DL (ref 70–99)
POTASSIUM SERPL-SCNC: 4.3 MMOL/L (ref 3.4–5.1)
PROT SERPL-MCNC: 7.3 G/DL (ref 6.4–8.2)
SODIUM SERPL-SCNC: 138 MMOL/L (ref 135–145)

## 2022-12-22 PROCEDURE — 87389 HIV-1 AG W/HIV-1&-2 AB AG IA: CPT | Performed by: INTERNAL MEDICINE

## 2022-12-22 PROCEDURE — 85613 RUSSELL VIPER VENOM DILUTED: CPT | Performed by: INTERNAL MEDICINE

## 2022-12-22 PROCEDURE — 85652 RBC SED RATE AUTOMATED: CPT | Performed by: INTERNAL MEDICINE

## 2022-12-22 PROCEDURE — 87340 HEPATITIS B SURFACE AG IA: CPT | Performed by: INTERNAL MEDICINE

## 2022-12-22 PROCEDURE — 85390 FIBRINOLYSINS SCREEN I&R: CPT | Performed by: INTERNAL MEDICINE

## 2022-12-22 PROCEDURE — 86803 HEPATITIS C AB TEST: CPT | Performed by: INTERNAL MEDICINE

## 2022-12-22 PROCEDURE — 80053 COMPREHEN METABOLIC PANEL: CPT | Performed by: INTERNAL MEDICINE

## 2022-12-22 PROCEDURE — 85670 THROMBIN TIME PLASMA: CPT | Performed by: INTERNAL MEDICINE

## 2022-12-22 PROCEDURE — 86146 BETA-2 GLYCOPROTEIN ANTIBODY: CPT | Performed by: INTERNAL MEDICINE

## 2022-12-22 PROCEDURE — 36415 COLL VENOUS BLD VENIPUNCTURE: CPT | Performed by: INTERNAL MEDICINE

## 2022-12-22 PROCEDURE — 86147 CARDIOLIPIN ANTIBODY EA IG: CPT | Performed by: INTERNAL MEDICINE

## 2022-12-22 PROCEDURE — 86038 ANTINUCLEAR ANTIBODIES: CPT | Performed by: INTERNAL MEDICINE

## 2022-12-22 PROCEDURE — 85732 THROMBOPLASTIN TIME PARTIAL: CPT | Performed by: INTERNAL MEDICINE

## 2022-12-23 ENCOUNTER — APPOINTMENT (OUTPATIENT)
Dept: MATERNAL FETAL MEDICINE | Age: 37
End: 2022-12-23

## 2022-12-23 ENCOUNTER — V-VISIT (OUTPATIENT)
Dept: OBGYN | Age: 37
End: 2022-12-23

## 2022-12-23 DIAGNOSIS — O09.521 MULTIGRAVIDA OF ADVANCED MATERNAL AGE IN FIRST TRIMESTER: Primary | ICD-10-CM

## 2022-12-23 DIAGNOSIS — L29.8 AQUAGENIC PRURITUS: ICD-10-CM

## 2022-12-23 DIAGNOSIS — O09.521 MULTIGRAVIDA OF ADVANCED MATERNAL AGE IN FIRST TRIMESTER: ICD-10-CM

## 2022-12-23 DIAGNOSIS — F32.1 CURRENT MODERATE EPISODE OF MAJOR DEPRESSIVE DISORDER, UNSPECIFIED WHETHER RECURRENT (CMD): ICD-10-CM

## 2022-12-23 DIAGNOSIS — Z83.2 FAMILY HISTORY OF AUTOIMMUNE DISORDER: ICD-10-CM

## 2022-12-23 PROBLEM — Z76.89 ENCOUNTER TO ESTABLISH CARE: Status: RESOLVED | Noted: 2022-12-20 | Resolved: 2022-12-23

## 2022-12-23 PROBLEM — O09.529 AMA (ADVANCED MATERNAL AGE) MULTIGRAVIDA 35+ (CMD): Status: ACTIVE | Noted: 2022-12-23

## 2022-12-23 PROBLEM — Z33.1 PREGNANCY, INCIDENTAL: Status: RESOLVED | Noted: 2022-12-20 | Resolved: 2022-12-23

## 2022-12-23 PROBLEM — O09.529 AMA (ADVANCED MATERNAL AGE) MULTIGRAVIDA 35+: Status: RESOLVED | Noted: 2022-12-23 | Resolved: 2022-12-23

## 2022-12-23 PROBLEM — O09.529 AMA (ADVANCED MATERNAL AGE) MULTIGRAVIDA 35+: Status: ACTIVE | Noted: 2022-12-23

## 2022-12-23 LAB
ANA SER QL IA: NEGATIVE
HBV SURFACE AG SER QL: NEGATIVE
HCV AB SER QL: NEGATIVE
HIV 1+2 AB+HIV1 P24 AG SERPL QL IA: NONREACTIVE
PATH REV: NORMAL
SCREEN APTT: 23.2 SEC (ref 22–30)
SCREEN DRVVT: 35.7 SEC
THROMBIN TIME: 15.8 SEC (ref 15.3–21.1)

## 2022-12-23 PROCEDURE — 0500F INITIAL PRENATAL CARE VISIT: CPT | Performed by: NURSE PRACTITIONER

## 2022-12-23 RX ORDER — METOCLOPRAMIDE 10 MG/1
10 TABLET ORAL EVERY 6 HOURS PRN
Qty: 30 TABLET | Refills: 1 | Status: SHIPPED | OUTPATIENT
Start: 2022-12-23 | End: 2023-01-24 | Stop reason: ALTCHOICE

## 2022-12-23 ASSESSMENT — EDINBURGH POSTNATAL DEPRESSION SCALE (EPDS)
TOTAL SCORE: 13
I HAVE BLAMED MYSELF UNNECESSARILY WHEN THINGS WENT WRONG: YES, MOST OF THE TIME
I HAVE BEEN SO UNHAPPY THAT I HAVE HAD DIFFICULTY SLEEPING: NOT AT ALL
THE THOUGHT OF HARMING MYSELF HAS OCCURRED TO ME: HARDLY EVER
I HAVE FELT SCARED OR PANICKY FOR NO GOOD REASON: NO, NOT MUCH
THINGS HAVE BEEN GETTING ON TOP OF ME: YES, SOMETIMES I HAVEN'T BEEN COPING AS WELL AS USUAL
I HAVE BEEN SO UNHAPPY THAT I HAVE BEEN CRYING: ONLY OCCASIONALLY
I HAVE LOOKED FORWARD WITH ENJOYMENT TO THINGS: RATHER LESS THAN I USED TO
I HAVE BEEN ABLE TO LAUGH AND SEE THE FUNNY SIDE OF THINGS: NOT QUITE SO MUCH NOW
I HAVE FELT SAD OR MISERABLE: NOT VERY OFTEN
I HAVE BEEN ANXIOUS OR WORRIED FOR NO GOOD REASON: YES, SOMETIMES

## 2022-12-26 LAB
B2 GLYCOPROT1 IGA SERPL IA-ACNC: <20 SAU
B2 GLYCOPROT1 IGG SERPL IA-ACNC: <20 SGU
B2 GLYCOPROT1 IGM SERPL IA-ACNC: <20 SMU
CARDIOLIPIN IGA SER IA-ACNC: <20 APL
CARDIOLIPIN IGG SER IA-ACNC: <20 GPL
CARDIOLIPIN IGM SER IA-ACNC: <20 MPL

## 2022-12-28 ENCOUNTER — EXTERNAL LAB (OUTPATIENT)
Dept: OBGYN | Age: 37
End: 2022-12-28

## 2022-12-28 ENCOUNTER — LAB SERVICES (OUTPATIENT)
Dept: LAB | Age: 37
End: 2022-12-28

## 2022-12-28 DIAGNOSIS — O09.521 MULTIGRAVIDA OF ADVANCED MATERNAL AGE IN FIRST TRIMESTER: ICD-10-CM

## 2022-12-28 DIAGNOSIS — O09.521 MULTIGRAVIDA OF ADVANCED MATERNAL AGE IN FIRST TRIMESTER: Primary | ICD-10-CM

## 2022-12-28 LAB
BKR KIT TESTING DISCLAIMER STATEMENT: NORMAL
PANORAMA  PRENATAL SCREEN SUMMARY: NORMAL
RUBV IGG SERPL IA-ACNC: 21.3 UNITS/ML

## 2022-12-28 PROCEDURE — 86850 RBC ANTIBODY SCREEN: CPT | Performed by: INTERNAL MEDICINE

## 2022-12-28 PROCEDURE — 86762 RUBELLA ANTIBODY: CPT | Performed by: INTERNAL MEDICINE

## 2022-12-28 PROCEDURE — 87591 N.GONORRHOEAE DNA AMP PROB: CPT | Performed by: INTERNAL MEDICINE

## 2022-12-28 PROCEDURE — 86900 BLOOD TYPING SEROLOGIC ABO: CPT | Performed by: INTERNAL MEDICINE

## 2022-12-28 PROCEDURE — 87086 URINE CULTURE/COLONY COUNT: CPT | Performed by: INTERNAL MEDICINE

## 2022-12-28 PROCEDURE — 86901 BLOOD TYPING SEROLOGIC RH(D): CPT | Performed by: INTERNAL MEDICINE

## 2022-12-28 PROCEDURE — 87491 CHLMYD TRACH DNA AMP PROBE: CPT | Performed by: INTERNAL MEDICINE

## 2022-12-28 PROCEDURE — 36415 COLL VENOUS BLD VENIPUNCTURE: CPT | Performed by: INTERNAL MEDICINE

## 2022-12-28 PROCEDURE — 85027 COMPLETE CBC AUTOMATED: CPT | Performed by: INTERNAL MEDICINE

## 2022-12-28 PROCEDURE — 86787 VARICELLA-ZOSTER ANTIBODY: CPT | Performed by: INTERNAL MEDICINE

## 2022-12-28 PROCEDURE — 86780 TREPONEMA PALLIDUM: CPT | Performed by: INTERNAL MEDICINE

## 2022-12-29 LAB
ABO + RH BLD: NORMAL
BACTERIA UR CULT: NORMAL
BLD GP AB SCN SERPL QL GEL: NEGATIVE
C TRACH RRNA UR QL NAA+PROBE: NEGATIVE
DEPRECATED RDW RBC: 44 FL (ref 39–50)
ERYTHROCYTE [DISTWIDTH] IN BLOOD: 13.2 % (ref 11–15)
HCT VFR BLD CALC: 36.9 % (ref 36–46.5)
HGB BLD-MCNC: 11.7 G/DL (ref 12–15.5)
Lab: NORMAL
MCH RBC QN AUTO: 28.9 PG (ref 26–34)
MCHC RBC AUTO-ENTMCNC: 31.7 G/DL (ref 32–36.5)
MCV RBC AUTO: 91.1 FL (ref 78–100)
N GONORRHOEA RRNA UR QL NAA+PROBE: NEGATIVE
NRBC BLD MANUAL-RTO: 0 /100 WBC
PLATELET # BLD AUTO: 258 K/MCL (ref 140–450)
RBC # BLD: 4.05 MIL/MCL (ref 4–5.2)
T PALLIDUM IGG SER QL IA: NONREACTIVE
VZV IGG SER IA-ACNC: NORMAL
WBC # BLD: 8.1 K/MCL (ref 4.2–11)

## 2023-01-03 ENCOUNTER — TELEPHONE (OUTPATIENT)
Dept: OBGYN | Age: 38
End: 2023-01-03

## 2023-01-12 ENCOUNTER — ADMINISTRATIVE DOCUMENTATION (OUTPATIENT)
Dept: OBGYN | Age: 38
End: 2023-01-12

## 2023-01-17 ENCOUNTER — ADMINISTRATIVE DOCUMENTATION (OUTPATIENT)
Dept: OBGYN | Age: 38
End: 2023-01-17

## 2023-01-24 ENCOUNTER — OB CHECK (OUTPATIENT)
Dept: OBGYN | Age: 38
End: 2023-01-24

## 2023-01-24 VITALS
BODY MASS INDEX: 22.04 KG/M2 | SYSTOLIC BLOOD PRESSURE: 110 MMHG | WEIGHT: 137.13 LBS | HEIGHT: 66 IN | HEART RATE: 72 BPM | DIASTOLIC BLOOD PRESSURE: 64 MMHG | OXYGEN SATURATION: 99 % | TEMPERATURE: 97.8 F

## 2023-01-24 DIAGNOSIS — O09.522 MULTIGRAVIDA OF ADVANCED MATERNAL AGE IN SECOND TRIMESTER: Primary | ICD-10-CM

## 2023-01-24 DIAGNOSIS — Z12.4 SCREENING FOR CERVICAL CANCER: ICD-10-CM

## 2023-01-24 PROBLEM — O09.529 AMA (ADVANCED MATERNAL AGE) MULTIGRAVIDA 35+: Chronic | Status: ACTIVE | Noted: 2022-12-23

## 2023-01-24 PROCEDURE — 88175 CYTOPATH C/V AUTO FLUID REDO: CPT | Performed by: INTERNAL MEDICINE

## 2023-01-24 PROCEDURE — 0502F SUBSEQUENT PRENATAL CARE: CPT | Performed by: NURSE PRACTITIONER

## 2023-01-24 PROCEDURE — 87624 HPV HI-RISK TYP POOLED RSLT: CPT | Performed by: INTERNAL MEDICINE

## 2023-01-24 RX ORDER — SERTRALINE HYDROCHLORIDE 100 MG/1
150 TABLET, FILM COATED ORAL DAILY
Qty: 45 TABLET | Refills: 2 | Status: SHIPPED | OUTPATIENT
Start: 2023-01-24 | End: 2023-10-12

## 2023-01-24 ASSESSMENT — PAIN SCALES - GENERAL: PAINLEVEL: 0

## 2023-01-25 ENCOUNTER — TELEPHONE (OUTPATIENT)
Dept: MATERNAL FETAL MEDICINE | Age: 38
End: 2023-01-25

## 2023-01-27 ENCOUNTER — IMMUNIZATION (OUTPATIENT)
Dept: FAMILY MEDICINE | Age: 38
End: 2023-01-27

## 2023-01-27 VITALS — TEMPERATURE: 98.7 F

## 2023-01-27 DIAGNOSIS — Z23 NEED FOR VACCINATION: Primary | ICD-10-CM

## 2023-01-27 PROCEDURE — 0124A COVID PFIZER BIVALENT BOOSTER 12Y+: CPT | Performed by: FAMILY MEDICINE

## 2023-01-27 PROCEDURE — 91312 COVID PFIZER BIVALENT BOOSTER 12Y+: CPT | Performed by: FAMILY MEDICINE

## 2023-01-31 ENCOUNTER — LAB SERVICES (OUTPATIENT)
Dept: LAB | Age: 38
End: 2023-01-31

## 2023-01-31 DIAGNOSIS — O09.522 MULTIGRAVIDA OF ADVANCED MATERNAL AGE IN SECOND TRIMESTER: ICD-10-CM

## 2023-01-31 LAB
CASE RPRT: NORMAL
CLINICAL INFO: NORMAL
CYTOLOGY CVX/VAG STUDY: NORMAL
HPV16+18+45 E6+E7MRNA CVX NAA+PROBE: NEGATIVE
Lab: NORMAL
PAP EDUCATIONAL NOTE: NORMAL
SPECIMEN ADEQUACY: NORMAL

## 2023-01-31 PROCEDURE — 36415 COLL VENOUS BLD VENIPUNCTURE: CPT | Performed by: NURSE PRACTITIONER

## 2023-01-31 PROCEDURE — 82105 ALPHA-FETOPROTEIN SERUM: CPT | Performed by: INTERNAL MEDICINE

## 2023-02-01 LAB
# FETUSES US: NORMAL
AFP MOM SERPL: 0.51 MOM
AFP SERPL-MCNC: 16.3 NG/ML
AGE AT DELIVERY: 38.33
GA: NORMAL WK
HX OF TRISOMY 21 NARR: NO
IDDM PATIENT QL: NO
METHOD BEST OVERALL GA ESTIMATE: NORMAL
NEURAL TUBE DEFECT RISK FETUS: NORMAL %
SERVICE CMNT-IMP: NORMAL
SERVICE CMNT-IMP: NORMAL
TOBAC SMOKE STAT: NORMAL
TS 21 RISK FETUS: NORMAL

## 2023-02-08 DIAGNOSIS — R11.2 NAUSEA AND VOMITING, UNSPECIFIED VOMITING TYPE: Primary | ICD-10-CM

## 2023-02-08 DIAGNOSIS — O26.819 PREGNANCY RELATED FATIGUE, ANTEPARTUM: ICD-10-CM

## 2023-02-09 ENCOUNTER — LAB SERVICES (OUTPATIENT)
Dept: LAB | Age: 38
End: 2023-02-09

## 2023-02-09 DIAGNOSIS — O26.819 PREGNANCY RELATED FATIGUE, ANTEPARTUM: ICD-10-CM

## 2023-02-09 DIAGNOSIS — R11.2 NAUSEA AND VOMITING, UNSPECIFIED VOMITING TYPE: ICD-10-CM

## 2023-02-09 PROCEDURE — 36415 COLL VENOUS BLD VENIPUNCTURE: CPT | Performed by: NURSE PRACTITIONER

## 2023-02-09 PROCEDURE — 86609 BACTERIUM ANTIBODY: CPT | Performed by: INTERNAL MEDICINE

## 2023-02-17 LAB — LISTERIA AB TITR SER CF: NORMAL {TITER}

## 2023-02-21 ENCOUNTER — OB CHECK (OUTPATIENT)
Dept: OBGYN | Age: 38
End: 2023-02-21

## 2023-02-21 VITALS
BODY MASS INDEX: 22.92 KG/M2 | SYSTOLIC BLOOD PRESSURE: 102 MMHG | WEIGHT: 142.64 LBS | DIASTOLIC BLOOD PRESSURE: 65 MMHG | HEART RATE: 75 BPM | TEMPERATURE: 97.4 F | OXYGEN SATURATION: 97 % | HEIGHT: 66 IN

## 2023-02-21 DIAGNOSIS — O09.522 MULTIGRAVIDA OF ADVANCED MATERNAL AGE IN SECOND TRIMESTER: Primary | Chronic | ICD-10-CM

## 2023-02-21 DIAGNOSIS — O22.42 HEMORRHOIDS DURING PREGNANCY IN SECOND TRIMESTER: ICD-10-CM

## 2023-02-21 PROCEDURE — 0502F SUBSEQUENT PRENATAL CARE: CPT | Performed by: OBSTETRICS & GYNECOLOGY

## 2023-02-21 RX ORDER — ASPIRIN 81 MG/1
81 TABLET ORAL DAILY
Qty: 30 TABLET | Refills: 3 | Status: SHIPPED | OUTPATIENT
Start: 2023-02-21 | End: 2023-04-18

## 2023-03-07 ENCOUNTER — OFFICE VISIT (OUTPATIENT)
Dept: MATERNAL FETAL MEDICINE | Age: 38
End: 2023-03-07

## 2023-03-07 DIAGNOSIS — O09.522 MULTIGRAVIDA OF ADVANCED MATERNAL AGE IN SECOND TRIMESTER: ICD-10-CM

## 2023-03-07 PROCEDURE — 76811 OB US DETAILED SNGL FETUS: CPT | Performed by: OBSTETRICS & GYNECOLOGY

## 2023-03-08 ENCOUNTER — OFFICE VISIT (OUTPATIENT)
Dept: PEDIATRIC CARDIOLOGY | Age: 38
End: 2023-03-08
Attending: PEDIATRICS

## 2023-03-08 ENCOUNTER — HOSPITAL ENCOUNTER (OUTPATIENT)
Dept: PEDIATRIC CARDIOLOGY | Age: 38
Discharge: HOME | End: 2023-03-08
Attending: PEDIATRICS

## 2023-03-08 DIAGNOSIS — O09.529 ANTEPARTUM MULTIGRAVIDA OF ADVANCED MATERNAL AGE: ICD-10-CM

## 2023-03-08 DIAGNOSIS — O09.891 MEDICATION EXPOSURE DURING FIRST TRIMESTER OF PREGNANCY: ICD-10-CM

## 2023-03-08 DIAGNOSIS — O09.522 MULTIGRAVIDA OF ADVANCED MATERNAL AGE IN SECOND TRIMESTER: ICD-10-CM

## 2023-03-08 DIAGNOSIS — O09.891 MEDICATION EXPOSURE DURING FIRST TRIMESTER OF PREGNANCY: Primary | ICD-10-CM

## 2023-03-08 PROCEDURE — 76827 ECHO EXAM OF FETAL HEART: CPT

## 2023-03-08 PROCEDURE — 93325 DOPPLER ECHO COLOR FLOW MAPG: CPT | Performed by: PEDIATRICS

## 2023-03-08 PROCEDURE — 76825 ECHO EXAM OF FETAL HEART: CPT | Performed by: PEDIATRICS

## 2023-03-08 PROCEDURE — 76827 ECHO EXAM OF FETAL HEART: CPT | Performed by: PEDIATRICS

## 2023-03-08 PROCEDURE — 99242 OFF/OP CONSLTJ NEW/EST SF 20: CPT | Performed by: PEDIATRICS

## 2023-03-21 ENCOUNTER — OB CHECK (OUTPATIENT)
Dept: OBGYN | Age: 38
End: 2023-03-21

## 2023-03-21 VITALS
HEIGHT: 66 IN | BODY MASS INDEX: 24 KG/M2 | HEART RATE: 83 BPM | WEIGHT: 149.36 LBS | TEMPERATURE: 96.1 F | OXYGEN SATURATION: 95 % | DIASTOLIC BLOOD PRESSURE: 64 MMHG | SYSTOLIC BLOOD PRESSURE: 107 MMHG

## 2023-03-21 DIAGNOSIS — O09.522 MULTIGRAVIDA OF ADVANCED MATERNAL AGE IN SECOND TRIMESTER: Primary | Chronic | ICD-10-CM

## 2023-03-21 PROCEDURE — 0502F SUBSEQUENT PRENATAL CARE: CPT | Performed by: OBSTETRICS & GYNECOLOGY

## 2023-03-21 ASSESSMENT — PAIN SCALES - GENERAL: PAINLEVEL: 0

## 2023-04-12 ENCOUNTER — TELEPHONE (OUTPATIENT)
Dept: OBGYN | Age: 38
End: 2023-04-12

## 2023-04-18 ENCOUNTER — OB CHECK (OUTPATIENT)
Dept: OBGYN | Age: 38
End: 2023-04-18

## 2023-04-18 VITALS
SYSTOLIC BLOOD PRESSURE: 115 MMHG | HEART RATE: 84 BPM | TEMPERATURE: 97.5 F | HEIGHT: 66 IN | WEIGHT: 155.31 LBS | BODY MASS INDEX: 24.96 KG/M2 | OXYGEN SATURATION: 97 % | DIASTOLIC BLOOD PRESSURE: 67 MMHG

## 2023-04-18 DIAGNOSIS — O09.523 MULTIGRAVIDA OF ADVANCED MATERNAL AGE IN THIRD TRIMESTER: Primary | Chronic | ICD-10-CM

## 2023-04-18 PROCEDURE — 0502F SUBSEQUENT PRENATAL CARE: CPT | Performed by: NURSE PRACTITIONER

## 2023-04-18 ASSESSMENT — PAIN SCALES - GENERAL: PAINLEVEL: 0

## 2023-04-19 ENCOUNTER — TELEPHONE (OUTPATIENT)
Dept: OBGYN | Age: 38
End: 2023-04-19

## 2023-05-02 ENCOUNTER — OB CHECK (OUTPATIENT)
Dept: OBGYN | Age: 38
End: 2023-05-02

## 2023-05-02 VITALS
BODY MASS INDEX: 25.71 KG/M2 | HEIGHT: 66 IN | TEMPERATURE: 97.6 F | HEART RATE: 85 BPM | SYSTOLIC BLOOD PRESSURE: 115 MMHG | DIASTOLIC BLOOD PRESSURE: 68 MMHG | WEIGHT: 159.94 LBS | OXYGEN SATURATION: 99 %

## 2023-05-02 DIAGNOSIS — Z3A.28 28 WEEKS GESTATION OF PREGNANCY: Primary | ICD-10-CM

## 2023-05-02 DIAGNOSIS — Z23 NEED FOR TDAP VACCINATION: ICD-10-CM

## 2023-05-02 LAB
APPEARANCE, POC: CLEAR
BILIRUBIN, POC: NEGATIVE
COLOR, POC: YELLOW
GLUCOSE UR-MCNC: NEGATIVE MG/DL
KETONES, POC: NEGATIVE MG/DL
NITRITE, POC: NEGATIVE
OCCULT BLOOD, POC: NEGATIVE
PH UR: 7 [PH] (ref 5–7)
PROT UR-MCNC: NEGATIVE MG/DL
SP GR UR: 1.01 (ref 1–1.03)
UROBILINOGEN UR-MCNC: 0.2 MG/DL (ref 0–1)
WBC (LEUKOCYTE) ESTERASE, POC: NEGATIVE

## 2023-05-02 PROCEDURE — 90471 IMMUNIZATION ADMIN: CPT | Performed by: NURSE PRACTITIONER

## 2023-05-02 PROCEDURE — 0502F SUBSEQUENT PRENATAL CARE: CPT | Performed by: NURSE PRACTITIONER

## 2023-05-02 PROCEDURE — 90715 TDAP VACCINE 7 YRS/> IM: CPT | Performed by: NURSE PRACTITIONER

## 2023-05-02 ASSESSMENT — EDINBURGH POSTNATAL DEPRESSION SCALE (EPDS)
I HAVE BEEN SO UNHAPPY THAT I HAVE HAD DIFFICULTY SLEEPING: NOT VERY OFTEN
I HAVE FELT SAD OR MISERABLE: NOT VERY OFTEN
I HAVE BEEN ANXIOUS OR WORRIED FOR NO GOOD REASON: YES, SOMETIMES
I HAVE BEEN ABLE TO LAUGH AND SEE THE FUNNY SIDE OF THINGS: AS MUCH AS I ALWAYS COULD
TOTAL SCORE: 9
I HAVE BLAMED MYSELF UNNECESSARILY WHEN THINGS WENT WRONG: NOT VERY OFTEN
I HAVE LOOKED FORWARD WITH ENJOYMENT TO THINGS: RATHER LESS THAN I USED TO
I HAVE BEEN SO UNHAPPY THAT I HAVE BEEN CRYING: ONLY OCCASIONALLY
THE THOUGHT OF HARMING MYSELF HAS OCCURRED TO ME: NEVER
THINGS HAVE BEEN GETTING ON TOP OF ME: NO, MOST OF THE TIME I HAVE COPED QUITE WELL
I HAVE FELT SCARED OR PANICKY FOR NO GOOD REASON: NO, NOT MUCH

## 2023-05-02 ASSESSMENT — PAIN SCALES - GENERAL: PAINLEVEL: 0

## 2023-05-03 ENCOUNTER — APPOINTMENT (OUTPATIENT)
Dept: URBAN - METROPOLITAN AREA CLINIC 316 | Age: 38
Setting detail: DERMATOLOGY
End: 2023-05-04

## 2023-05-03 DIAGNOSIS — D22 MELANOCYTIC NEVI: ICD-10-CM

## 2023-05-03 DIAGNOSIS — M71 OTHER BURSOPATHIES: ICD-10-CM

## 2023-05-03 DIAGNOSIS — Z71.89 OTHER SPECIFIED COUNSELING: ICD-10-CM

## 2023-05-03 PROBLEM — D22.5 MELANOCYTIC NEVI OF TRUNK: Status: ACTIVE | Noted: 2023-05-03

## 2023-05-03 PROBLEM — M71.342 OTHER BURSAL CYST, LEFT HAND: Status: ACTIVE | Noted: 2023-05-03

## 2023-05-03 PROCEDURE — OTHER COUNSELING: OTHER

## 2023-05-03 PROCEDURE — 99203 OFFICE O/P NEW LOW 30 MIN: CPT

## 2023-05-03 ASSESSMENT — LOCATION DETAILED DESCRIPTION DERM
LOCATION DETAILED: RIGHT BUTTOCK
LOCATION DETAILED: LEFT RADIAL DORSAL HAND
LOCATION DETAILED: MIDDLE STERNUM
LOCATION DETAILED: RIGHT SUPERIOR MEDIAL UPPER BACK

## 2023-05-03 ASSESSMENT — LOCATION SIMPLE DESCRIPTION DERM
LOCATION SIMPLE: CHEST
LOCATION SIMPLE: RIGHT UPPER BACK
LOCATION SIMPLE: RIGHT BUTTOCK
LOCATION SIMPLE: LEFT HAND

## 2023-05-03 ASSESSMENT — LOCATION ZONE DERM
LOCATION ZONE: HAND
LOCATION ZONE: TRUNK

## 2023-05-04 ENCOUNTER — TELEPHONE (OUTPATIENT)
Dept: OBGYN | Age: 38
End: 2023-05-04

## 2023-05-16 ENCOUNTER — APPOINTMENT (OUTPATIENT)
Dept: OBGYN | Age: 38
End: 2023-05-16

## 2023-05-18 ENCOUNTER — FIRST OB VISIT (OUTPATIENT)
Dept: OBGYN | Age: 38
End: 2023-05-18

## 2023-05-18 VITALS
DIASTOLIC BLOOD PRESSURE: 63 MMHG | BODY MASS INDEX: 26.04 KG/M2 | HEART RATE: 80 BPM | TEMPERATURE: 97.1 F | RESPIRATION RATE: 18 BRPM | OXYGEN SATURATION: 98 % | HEIGHT: 66 IN | WEIGHT: 162.04 LBS | SYSTOLIC BLOOD PRESSURE: 99 MMHG

## 2023-05-18 DIAGNOSIS — O09.523 MULTIGRAVIDA OF ADVANCED MATERNAL AGE IN THIRD TRIMESTER: Primary | Chronic | ICD-10-CM

## 2023-05-18 DIAGNOSIS — F32.1 CURRENT MODERATE EPISODE OF MAJOR DEPRESSIVE DISORDER, UNSPECIFIED WHETHER RECURRENT (CMD): ICD-10-CM

## 2023-05-18 LAB
APPEARANCE, POC: CLEAR
BILIRUBIN, POC: NEGATIVE
COLOR, POC: YELLOW
GLUCOSE UR-MCNC: NEGATIVE MG/DL
KETONES, POC: NEGATIVE MG/DL
NITRITE, POC: NEGATIVE
OCCULT BLOOD, POC: ABNORMAL
PH UR: 7.5 [PH] (ref 5–7)
PROT UR-MCNC: NEGATIVE MG/DL
SP GR UR: 1.01 (ref 1–1.03)
UROBILINOGEN UR-MCNC: 0.2 MG/DL (ref 0–1)
WBC (LEUKOCYTE) ESTERASE, POC: NEGATIVE

## 2023-05-18 PROCEDURE — 0500F INITIAL PRENATAL CARE VISIT: CPT | Performed by: ADVANCED PRACTICE MIDWIFE

## 2023-05-18 SDOH — HEALTH STABILITY: MENTAL HEALTH: AUDIT TOTAL SCORE: 0

## 2023-05-18 SDOH — ECONOMIC STABILITY: FOOD INSECURITY: HOW OFTEN IN THE PAST 12 MONTHS WERE YOU WORRIED OR STRESSED ABOUT HAVING ENOUGH MONEY TO BUY NUTRITIOUS MEALS?: NEVER

## 2023-05-18 SDOH — ECONOMIC STABILITY: HOUSING INSECURITY: ARE YOU WORRIED ABOUT LOSING YOUR HOUSING?: NO

## 2023-05-18 SDOH — HEALTH STABILITY: MENTAL HEALTH: HOW MANY STANDARD DRINKS CONTAINING ALCOHOL DO YOU HAVE ON A TYPICAL DAY?: 0,1 OR 2

## 2023-05-18 SDOH — HEALTH STABILITY: MENTAL HEALTH: HOW OFTEN DO YOU HAVE 6 OR MORE DRINKS ON ONE OCCASION?: NEVER

## 2023-05-18 SDOH — ECONOMIC STABILITY: GENERAL

## 2023-05-18 SDOH — HEALTH STABILITY: MENTAL HEALTH: HOW OFTEN DO YOU HAVE A DRINK CONTAINING ALCOHOL?: NEVER

## 2023-05-18 ASSESSMENT — EDINBURGH POSTNATAL DEPRESSION SCALE (EPDS)
I HAVE BEEN ANXIOUS OR WORRIED FOR NO GOOD REASON: YES, SOMETIMES
I HAVE BEEN SO UNHAPPY THAT I HAVE BEEN CRYING: ONLY OCCASIONALLY
I HAVE BEEN SO UNHAPPY THAT I HAVE HAD DIFFICULTY SLEEPING: NOT VERY OFTEN
THINGS HAVE BEEN GETTING ON TOP OF ME: NO, MOST OF THE TIME I HAVE COPED QUITE WELL
I HAVE LOOKED FORWARD WITH ENJOYMENT TO THINGS: AS MUCH AS I EVER DID
TOTAL SCORE: 8
THE THOUGHT OF HARMING MYSELF HAS OCCURRED TO ME: NEVER
I HAVE FELT SAD OR MISERABLE: NOT VERY OFTEN
I HAVE BLAMED MYSELF UNNECESSARILY WHEN THINGS WENT WRONG: YES, SOME OF THE TIME
I HAVE BEEN ABLE TO LAUGH AND SEE THE FUNNY SIDE OF THINGS: AS MUCH AS I ALWAYS COULD
I HAVE FELT SCARED OR PANICKY FOR NO GOOD REASON: NO, NOT AT ALL

## 2023-05-18 ASSESSMENT — LIFESTYLE VARIABLES
DOES YOUR PARTNER HAVE A PROBLEM WITH ALCOHOL OR DRUG USE?: NO
DO ANY OF YOUR FRIENDS (PEERS) HAVE PROBLEMS WITH ALCOHOL OR DRUG USE?: NO
DID ANY OF YOUR PARENTS HAVE PROBLEMS WITH ALCOHOL OR DRUG USE?: YES
IN THE PAST MONTH, DID YOU DRINK BEER, WINE OR LIQUOR, OR USE OTHER DRUGS? (PREGNANCY): NO
BEFORE YOU WERE PREGNANT DID YOU HAVE A PROBLEM WITH ALCOHOL OR DRUG USE? (PAST): NO

## 2023-05-18 ASSESSMENT — PAIN SCALES - GENERAL: PAINLEVEL: 0

## 2023-05-19 ENCOUNTER — TELEPHONE (OUTPATIENT)
Dept: OBGYN | Age: 38
End: 2023-05-19

## 2023-05-22 ENCOUNTER — LAB SERVICES (OUTPATIENT)
Dept: LAB | Age: 38
End: 2023-05-22

## 2023-05-22 DIAGNOSIS — O09.523 MULTIGRAVIDA OF ADVANCED MATERNAL AGE IN THIRD TRIMESTER: ICD-10-CM

## 2023-05-22 PROCEDURE — 36415 COLL VENOUS BLD VENIPUNCTURE: CPT | Performed by: ADVANCED PRACTICE MIDWIFE

## 2023-05-22 PROCEDURE — 87389 HIV-1 AG W/HIV-1&-2 AB AG IA: CPT | Performed by: INTERNAL MEDICINE

## 2023-05-22 PROCEDURE — 85027 COMPLETE CBC AUTOMATED: CPT | Performed by: INTERNAL MEDICINE

## 2023-05-22 PROCEDURE — 82950 GLUCOSE TEST: CPT | Performed by: INTERNAL MEDICINE

## 2023-05-22 PROCEDURE — 86780 TREPONEMA PALLIDUM: CPT | Performed by: INTERNAL MEDICINE

## 2023-05-23 LAB
DEPRECATED RDW RBC: 46.8 FL (ref 39–50)
ERYTHROCYTE [DISTWIDTH] IN BLOOD: 13.6 % (ref 11–15)
GLUCOSE 1H P 50 G GLC PO SERPL-MCNC: 108 MG/DL (ref 65–139)
HCT VFR BLD CALC: 35 % (ref 36–46.5)
HGB BLD-MCNC: 11.4 G/DL (ref 12–15.5)
HIV 1+2 AB+HIV1 P24 AG SERPL QL IA: NONREACTIVE
MCH RBC QN AUTO: 30.5 PG (ref 26–34)
MCHC RBC AUTO-ENTMCNC: 32.6 G/DL (ref 32–36.5)
MCV RBC AUTO: 93.6 FL (ref 78–100)
NRBC BLD MANUAL-RTO: 0 /100 WBC
PLATELET # BLD AUTO: 223 K/MCL (ref 140–450)
RBC # BLD: 3.74 MIL/MCL (ref 4–5.2)
T PALLIDUM IGG SER QL IA: NONREACTIVE
WBC # BLD: 7.6 K/MCL (ref 4.2–11)

## 2023-05-24 ENCOUNTER — TELEPHONE (OUTPATIENT)
Dept: MATERNAL FETAL MEDICINE | Age: 38
End: 2023-05-24

## 2023-06-01 ENCOUNTER — OB CHECK (OUTPATIENT)
Dept: OBGYN | Age: 38
End: 2023-06-01

## 2023-06-01 VITALS
SYSTOLIC BLOOD PRESSURE: 106 MMHG | BODY MASS INDEX: 26.56 KG/M2 | DIASTOLIC BLOOD PRESSURE: 60 MMHG | HEIGHT: 66 IN | RESPIRATION RATE: 20 BRPM | HEART RATE: 91 BPM | WEIGHT: 165.24 LBS | OXYGEN SATURATION: 98 % | TEMPERATURE: 98.2 F

## 2023-06-01 DIAGNOSIS — O09.523 MULTIGRAVIDA OF ADVANCED MATERNAL AGE IN THIRD TRIMESTER: Primary | Chronic | ICD-10-CM

## 2023-06-01 DIAGNOSIS — Z3A.32 32 WEEKS GESTATION OF PREGNANCY: ICD-10-CM

## 2023-06-01 LAB
APPEARANCE, POC: CLEAR
BILIRUBIN, POC: NEGATIVE
COLOR, POC: YELLOW
GLUCOSE UR-MCNC: NEGATIVE MG/DL
KETONES, POC: NEGATIVE MG/DL
NITRITE, POC: NEGATIVE
OCCULT BLOOD, POC: NEGATIVE
PH UR: 8.5 [PH] (ref 5–7)
PROT UR-MCNC: ABNORMAL MG/DL
SP GR UR: 1.01 (ref 1–1.03)
UROBILINOGEN UR-MCNC: 0.2 MG/DL (ref 0–1)
WBC (LEUKOCYTE) ESTERASE, POC: NEGATIVE

## 2023-06-01 PROCEDURE — 0502F SUBSEQUENT PRENATAL CARE: CPT | Performed by: ADVANCED PRACTICE MIDWIFE

## 2023-06-01 ASSESSMENT — PAIN SCALES - GENERAL: PAINLEVEL: 0

## 2023-06-14 ENCOUNTER — OB CHECK (OUTPATIENT)
Dept: OBGYN | Age: 38
End: 2023-06-14

## 2023-06-14 VITALS
HEIGHT: 66 IN | TEMPERATURE: 96.1 F | DIASTOLIC BLOOD PRESSURE: 66 MMHG | WEIGHT: 169.75 LBS | SYSTOLIC BLOOD PRESSURE: 103 MMHG | HEART RATE: 77 BPM | BODY MASS INDEX: 27.28 KG/M2 | RESPIRATION RATE: 18 BRPM

## 2023-06-14 DIAGNOSIS — O09.523 MULTIGRAVIDA OF ADVANCED MATERNAL AGE IN THIRD TRIMESTER: Primary | Chronic | ICD-10-CM

## 2023-06-14 LAB
APPEARANCE, POC: CLEAR
BILIRUBIN, POC: NEGATIVE
COLOR, POC: YELLOW
GLUCOSE UR-MCNC: NEGATIVE MG/DL
KETONES, POC: NEGATIVE MG/DL
NITRITE, POC: NEGATIVE
OCCULT BLOOD, POC: NEGATIVE
PH UR: 7.5 [PH] (ref 5–7)
PROT UR-MCNC: NEGATIVE MG/DL
SP GR UR: 1.01 (ref 1–1.03)
UROBILINOGEN UR-MCNC: 0.2 MG/DL (ref 0–1)
WBC (LEUKOCYTE) ESTERASE, POC: NEGATIVE

## 2023-06-14 PROCEDURE — 0502F SUBSEQUENT PRENATAL CARE: CPT | Performed by: ADVANCED PRACTICE MIDWIFE

## 2023-06-14 ASSESSMENT — ENCOUNTER SYMPTOMS
CONSTITUTIONAL NEGATIVE: 1
PSYCHIATRIC NEGATIVE: 1
ABDOMINAL PAIN: 0

## 2023-06-14 ASSESSMENT — PAIN SCALES - GENERAL: PAINLEVEL: 4

## 2023-06-15 ENCOUNTER — OFFICE VISIT (OUTPATIENT)
Dept: MATERNAL FETAL MEDICINE | Age: 38
End: 2023-06-15

## 2023-06-15 DIAGNOSIS — Z36.89 ENCOUNTER FOR ULTRASOUND TO ASSESS INTERVAL GROWTH OF FETUS: Primary | ICD-10-CM

## 2023-06-15 PROCEDURE — 76816 OB US FOLLOW-UP PER FETUS: CPT | Performed by: OBSTETRICS & GYNECOLOGY

## 2023-06-29 ENCOUNTER — OB CHECK (OUTPATIENT)
Dept: OBGYN | Age: 38
End: 2023-06-29

## 2023-06-29 ENCOUNTER — APPOINTMENT (OUTPATIENT)
Dept: OBGYN | Age: 38
End: 2023-06-29

## 2023-06-29 ENCOUNTER — OB CHECK (OUTPATIENT)
Dept: MATERNAL FETAL MEDICINE | Age: 38
End: 2023-06-29
Attending: ADVANCED PRACTICE MIDWIFE

## 2023-06-29 VITALS
SYSTOLIC BLOOD PRESSURE: 113 MMHG | WEIGHT: 171.96 LBS | HEART RATE: 92 BPM | DIASTOLIC BLOOD PRESSURE: 67 MMHG | HEIGHT: 66 IN | TEMPERATURE: 97.6 F | RESPIRATION RATE: 18 BRPM | BODY MASS INDEX: 27.64 KG/M2

## 2023-06-29 DIAGNOSIS — O09.522 MULTIGRAVIDA OF ADVANCED MATERNAL AGE IN SECOND TRIMESTER: Primary | ICD-10-CM

## 2023-06-29 DIAGNOSIS — Z34.93 ENCOUNTER FOR SUPERVISION OF NORMAL PREGNANCY IN THIRD TRIMESTER, UNSPECIFIED GRAVIDITY: Primary | ICD-10-CM

## 2023-06-29 LAB
APPEARANCE, POC: ABNORMAL
BILIRUBIN, POC: NEGATIVE
COLOR, POC: YELLOW
GLUCOSE UR-MCNC: NEGATIVE MG/DL
KETONES, POC: NEGATIVE MG/DL
NITRITE, POC: NEGATIVE
OCCULT BLOOD, POC: NEGATIVE
PH UR: 7.5 [PH] (ref 5–7)
PROT UR-MCNC: NEGATIVE MG/DL
SP GR UR: 1.02 (ref 1–1.03)
UROBILINOGEN UR-MCNC: 0.2 MG/DL (ref 0–1)
WBC (LEUKOCYTE) ESTERASE, POC: NEGATIVE

## 2023-06-29 PROCEDURE — 0502F SUBSEQUENT PRENATAL CARE: CPT | Performed by: ADVANCED PRACTICE MIDWIFE

## 2023-06-29 PROCEDURE — 59025 FETAL NON-STRESS TEST: CPT | Performed by: OBSTETRICS & GYNECOLOGY

## 2023-06-29 PROCEDURE — 87081 CULTURE SCREEN ONLY: CPT | Performed by: INTERNAL MEDICINE

## 2023-06-29 PROCEDURE — 76815 OB US LIMITED FETUS(S): CPT | Performed by: OBSTETRICS & GYNECOLOGY

## 2023-06-29 ASSESSMENT — PAIN SCALES - GENERAL: PAINLEVEL: 0

## 2023-07-02 LAB — GP B STREP SPEC QL CULT: NORMAL

## 2023-07-06 ENCOUNTER — OB CHECK (OUTPATIENT)
Dept: OBGYN | Age: 38
End: 2023-07-06

## 2023-07-06 ENCOUNTER — OB CHECK (OUTPATIENT)
Dept: MATERNAL FETAL MEDICINE | Age: 38
End: 2023-07-06
Attending: ADVANCED PRACTICE MIDWIFE

## 2023-07-06 VITALS
HEIGHT: 66 IN | RESPIRATION RATE: 20 BRPM | OXYGEN SATURATION: 99 % | WEIGHT: 172.51 LBS | TEMPERATURE: 97.1 F | HEART RATE: 79 BPM | SYSTOLIC BLOOD PRESSURE: 106 MMHG | BODY MASS INDEX: 27.72 KG/M2 | DIASTOLIC BLOOD PRESSURE: 71 MMHG

## 2023-07-06 DIAGNOSIS — O09.522 MULTIGRAVIDA OF ADVANCED MATERNAL AGE IN SECOND TRIMESTER: Primary | ICD-10-CM

## 2023-07-06 DIAGNOSIS — O09.523 MULTIGRAVIDA OF ADVANCED MATERNAL AGE IN THIRD TRIMESTER: Primary | Chronic | ICD-10-CM

## 2023-07-06 PROCEDURE — 59025 FETAL NON-STRESS TEST: CPT | Performed by: OBSTETRICS & GYNECOLOGY

## 2023-07-06 PROCEDURE — 76815 OB US LIMITED FETUS(S): CPT | Performed by: OBSTETRICS & GYNECOLOGY

## 2023-07-06 PROCEDURE — 0502F SUBSEQUENT PRENATAL CARE: CPT | Performed by: ADVANCED PRACTICE MIDWIFE

## 2023-07-06 ASSESSMENT — PAIN SCALES - GENERAL: PAINLEVEL: 0

## 2023-07-13 ENCOUNTER — TELEPHONE (OUTPATIENT)
Dept: OBGYN | Age: 38
End: 2023-07-13

## 2023-07-13 ENCOUNTER — OB CHECK (OUTPATIENT)
Dept: MATERNAL FETAL MEDICINE | Age: 38
End: 2023-07-13
Attending: ADVANCED PRACTICE MIDWIFE

## 2023-07-13 ENCOUNTER — OB CHECK (OUTPATIENT)
Dept: OBGYN | Age: 38
End: 2023-07-13

## 2023-07-13 VITALS
HEART RATE: 86 BPM | WEIGHT: 178.57 LBS | HEIGHT: 66 IN | BODY MASS INDEX: 28.7 KG/M2 | DIASTOLIC BLOOD PRESSURE: 70 MMHG | RESPIRATION RATE: 18 BRPM | SYSTOLIC BLOOD PRESSURE: 119 MMHG | TEMPERATURE: 96.3 F

## 2023-07-13 DIAGNOSIS — O09.523 MULTIGRAVIDA OF ADVANCED MATERNAL AGE IN THIRD TRIMESTER: Primary | ICD-10-CM

## 2023-07-13 DIAGNOSIS — Z3A.38 38 WEEKS GESTATION OF PREGNANCY: ICD-10-CM

## 2023-07-13 DIAGNOSIS — O09.523 MULTIGRAVIDA OF ADVANCED MATERNAL AGE IN THIRD TRIMESTER: Primary | Chronic | ICD-10-CM

## 2023-07-13 LAB
APPEARANCE, POC: CLEAR
BILIRUBIN, POC: NEGATIVE
COLOR, POC: YELLOW
GLUCOSE UR-MCNC: NEGATIVE MG/DL
KETONES, POC: NEGATIVE MG/DL
NITRITE, POC: NEGATIVE
OCCULT BLOOD, POC: NEGATIVE
PH UR: 7 [PH] (ref 5–7)
PROT UR-MCNC: ABNORMAL MG/DL
SP GR UR: 1.02 (ref 1–1.03)
UROBILINOGEN UR-MCNC: 0.2 MG/DL (ref 0–1)
WBC (LEUKOCYTE) ESTERASE, POC: NEGATIVE

## 2023-07-13 PROCEDURE — 59025 FETAL NON-STRESS TEST: CPT | Performed by: OBSTETRICS & GYNECOLOGY

## 2023-07-13 PROCEDURE — 76815 OB US LIMITED FETUS(S): CPT | Performed by: OBSTETRICS & GYNECOLOGY

## 2023-07-13 PROCEDURE — 0502F SUBSEQUENT PRENATAL CARE: CPT | Performed by: ADVANCED PRACTICE MIDWIFE

## 2023-07-13 ASSESSMENT — LIFESTYLE VARIABLES
IN THE PAST MONTH, DID YOU DRINK BEER, WINE OR LIQUOR, OR USE OTHER DRUGS? (PREGNANCY): NO
DO ANY OF YOUR FRIENDS (PEERS) HAVE PROBLEMS WITH ALCOHOL OR DRUG USE?: NO
DID ANY OF YOUR PARENTS HAVE PROBLEMS WITH ALCOHOL OR DRUG USE?: NO
BEFORE YOU WERE PREGNANT DID YOU HAVE A PROBLEM WITH ALCOHOL OR DRUG USE? (PAST): NO
DOES YOUR PARTNER HAVE A PROBLEM WITH ALCOHOL OR DRUG USE?: NO

## 2023-07-13 ASSESSMENT — PAIN SCALES - GENERAL: PAINLEVEL: 0

## 2023-07-14 ENCOUNTER — ANESTHESIA (OUTPATIENT)
Dept: OBGYN | Age: 38
End: 2023-07-14

## 2023-07-14 ENCOUNTER — ANESTHESIA EVENT (OUTPATIENT)
Dept: OBGYN | Age: 38
End: 2023-07-14

## 2023-07-14 ENCOUNTER — HOSPITAL ENCOUNTER (OUTPATIENT)
Age: 38
Discharge: HOME | End: 2023-07-14
Attending: OBSTETRICS & GYNECOLOGY | Admitting: OBSTETRICS & GYNECOLOGY

## 2023-07-14 VITALS
SYSTOLIC BLOOD PRESSURE: 111 MMHG | WEIGHT: 178.57 LBS | DIASTOLIC BLOOD PRESSURE: 60 MMHG | TEMPERATURE: 98.1 F | BODY MASS INDEX: 28.7 KG/M2 | HEART RATE: 101 BPM | HEIGHT: 66 IN | RESPIRATION RATE: 18 BRPM | OXYGEN SATURATION: 98 %

## 2023-07-14 DIAGNOSIS — O32.0XX0 UNSTABLE FETAL LIE, SINGLE OR UNSPECIFIED FETUS: ICD-10-CM

## 2023-07-14 PROBLEM — O32.0XX1: Status: ACTIVE | Noted: 2023-07-14

## 2023-07-14 LAB
ABO + RH BLD: NORMAL
BASOPHILS # BLD: 0 K/MCL (ref 0–0.3)
BASOPHILS NFR BLD: 1 %
BLD GP AB SCN SERPL QL GEL: NEGATIVE
DEPRECATED RDW RBC: 45 FL (ref 39–50)
EOSINOPHIL # BLD: 0.1 K/MCL (ref 0–0.5)
EOSINOPHIL NFR BLD: 1 %
ERYTHROCYTE [DISTWIDTH] IN BLOOD: 13.6 % (ref 11–15)
HCT VFR BLD CALC: 35.4 % (ref 36–46.5)
HGB BLD-MCNC: 11.9 G/DL (ref 12–15.5)
IMM GRANULOCYTES # BLD AUTO: 0.1 K/MCL (ref 0–0.2)
IMM GRANULOCYTES # BLD: 1 %
LYMPHOCYTES # BLD: 1.7 K/MCL (ref 1–4.8)
LYMPHOCYTES NFR BLD: 21 %
MCH RBC QN AUTO: 30.9 PG (ref 26–34)
MCHC RBC AUTO-ENTMCNC: 33.6 G/DL (ref 32–36.5)
MCV RBC AUTO: 91.9 FL (ref 78–100)
MONOCYTES # BLD: 0.6 K/MCL (ref 0.3–0.9)
MONOCYTES NFR BLD: 7 %
NEUTROPHILS # BLD: 5.7 K/MCL (ref 1.8–7.7)
NEUTROPHILS NFR BLD: 69 %
NRBC BLD MANUAL-RTO: 0 /100 WBC
PLATELET # BLD AUTO: 155 K/MCL (ref 140–450)
RBC # BLD: 3.85 MIL/MCL (ref 4–5.2)
TYPE AND SCREEN EXPIRATION DATE: NORMAL
WBC # BLD: 8 K/MCL (ref 4.2–11)

## 2023-07-14 PROCEDURE — 10002801 HB RX 250 W/O HCPCS: Performed by: STUDENT IN AN ORGANIZED HEALTH CARE EDUCATION/TRAINING PROGRAM

## 2023-07-14 PROCEDURE — 85025 COMPLETE CBC W/AUTO DIFF WBC: CPT | Performed by: MIDWIFE

## 2023-07-14 PROCEDURE — 10002807 HB RX 258: Performed by: MIDWIFE

## 2023-07-14 PROCEDURE — 10002800 HB RX 250 W HCPCS: Performed by: MIDWIFE

## 2023-07-14 PROCEDURE — X1094 NO CHARGE VISIT: HCPCS | Performed by: MIDWIFE

## 2023-07-14 PROCEDURE — 96372 THER/PROPH/DIAG INJ SC/IM: CPT | Performed by: MIDWIFE

## 2023-07-14 PROCEDURE — 59412 ANTEPARTUM MANIPULATION: CPT

## 2023-07-14 PROCEDURE — 13001455 HB PERINATAL CARE HIGH RISK

## 2023-07-14 PROCEDURE — 86901 BLOOD TYPING SEROLOGIC RH(D): CPT | Performed by: MIDWIFE

## 2023-07-14 PROCEDURE — 10002801 HB RX 250 W/O HCPCS: Performed by: ANESTHESIOLOGY

## 2023-07-14 PROCEDURE — 76815 OB US LIMITED FETUS(S): CPT | Performed by: MIDWIFE

## 2023-07-14 RX ORDER — LIDOCAINE HYDROCHLORIDE 20 MG/ML
INJECTION, SOLUTION EPIDURAL; INFILTRATION; INTRACAUDAL; PERINEURAL
Status: COMPLETED | OUTPATIENT
Start: 2023-07-14 | End: 2023-07-14

## 2023-07-14 RX ORDER — TERBUTALINE SULFATE 1 MG/ML
0.25 INJECTION, SOLUTION SUBCUTANEOUS
Status: COMPLETED | OUTPATIENT
Start: 2023-07-14 | End: 2023-07-14

## 2023-07-14 RX ORDER — 0.9 % SODIUM CHLORIDE 0.9 %
2 VIAL (ML) INJECTION EVERY 12 HOURS SCHEDULED
Status: DISCONTINUED | OUTPATIENT
Start: 2023-07-14 | End: 2023-07-14 | Stop reason: HOSPADM

## 2023-07-14 RX ORDER — SODIUM CHLORIDE, SODIUM LACTATE, POTASSIUM CHLORIDE, CALCIUM CHLORIDE 600; 310; 30; 20 MG/100ML; MG/100ML; MG/100ML; MG/100ML
INJECTION, SOLUTION INTRAVENOUS CONTINUOUS
Status: DISCONTINUED | OUTPATIENT
Start: 2023-07-14 | End: 2023-07-14 | Stop reason: HOSPADM

## 2023-07-14 RX ADMIN — SODIUM CHLORIDE, POTASSIUM CHLORIDE, SODIUM LACTATE AND CALCIUM CHLORIDE 125 ML/HR: 600; 310; 30; 20 INJECTION, SOLUTION INTRAVENOUS at 09:04

## 2023-07-14 RX ADMIN — LIDOCAINE HYDROCHLORIDE 5 ML: 20 INJECTION, SOLUTION EPIDURAL; INFILTRATION; INTRACAUDAL; PERINEURAL at 10:23

## 2023-07-14 RX ADMIN — LIDOCAINE HYDROCHLORIDE 9 ML: 20 INJECTION, SOLUTION EPIDURAL; INFILTRATION; INTRACAUDAL; PERINEURAL at 10:47

## 2023-07-14 RX ADMIN — LIDOCAINE HYDROCHLORIDE 5 ML: 20 INJECTION, SOLUTION EPIDURAL; INFILTRATION; INTRACAUDAL; PERINEURAL at 11:30

## 2023-07-14 RX ADMIN — LIDOCAINE HYDROCHLORIDE 5 ML: 20 INJECTION, SOLUTION EPIDURAL; INFILTRATION; INTRACAUDAL; PERINEURAL at 11:11

## 2023-07-14 RX ADMIN — Medication 100 MCG: at 11:40

## 2023-07-14 RX ADMIN — TERBUTALINE SULFATE 0.25 MG: 1 INJECTION, SOLUTION SUBCUTANEOUS at 11:22

## 2023-07-14 RX ADMIN — Medication 200 MCG: at 11:31

## 2023-07-14 RX ADMIN — Medication 100 MCG: at 11:45

## 2023-07-14 RX ADMIN — SODIUM CHLORIDE, POTASSIUM CHLORIDE, SODIUM LACTATE AND CALCIUM CHLORIDE 125 ML/HR: 600; 310; 30; 20 INJECTION, SOLUTION INTRAVENOUS at 10:20

## 2023-07-14 SDOH — SOCIAL STABILITY: SOCIAL NETWORK
HOW OFTEN DO YOU SEE OR TALK TO PEOPLE THAT YOU CARE ABOUT AND FEEL CLOSE TO? (FOR EXAMPLE: TALKING TO FRIENDS ON THE PHONE, VISITING FRIENDS OR FAMILY, GOING TO CHURCH OR CLUB MEETINGS): 1 OR 2 TIMES A WEEK

## 2023-07-14 SDOH — ECONOMIC STABILITY: FOOD INSECURITY: HOW OFTEN IN THE PAST 12 MONTHS WERE YOU WORRIED OR STRESSED ABOUT HAVING ENOUGH MONEY TO BUY NUTRITIOUS MEALS?: NEVER

## 2023-07-14 SDOH — ECONOMIC STABILITY: HOUSING INSECURITY: WHAT IS YOUR LIVING SITUATION TODAY?: SPOUSE;CHILDREN

## 2023-07-14 SDOH — ECONOMIC STABILITY: HOUSING INSECURITY: ARE YOU WORRIED ABOUT LOSING YOUR HOUSING?: NO

## 2023-07-14 SDOH — HEALTH STABILITY: GENERAL
BECAUSE OF A PHYSICAL, MENTAL, OR EMOTIONAL CONDITION, DO YOU HAVE SERIOUS DIFFICULTY CONCENTRATING, REMEMBERING OR MAKING DECISIONS?: NO

## 2023-07-14 SDOH — HEALTH STABILITY: PHYSICAL HEALTH: DO YOU HAVE SERIOUS DIFFICULTY WALKING OR CLIMBING STAIRS?: NO

## 2023-07-14 SDOH — HEALTH STABILITY: PHYSICAL HEALTH: DO YOU HAVE DIFFICULTY DRESSING OR BATHING?: NO

## 2023-07-14 SDOH — ECONOMIC STABILITY: HOUSING INSECURITY: WHAT IS YOUR LIVING SITUATION TODAY?: HOUSE

## 2023-07-14 SDOH — SOCIAL STABILITY: SOCIAL NETWORK: SUPPORT SYSTEMS: FRIENDS;FAMILY MEMBERS;SIGNIFICANT OTHER

## 2023-07-14 SDOH — HEALTH STABILITY: GENERAL: BECAUSE OF A PHYSICAL, MENTAL, OR EMOTIONAL CONDITION, DO YOU HAVE DIFFICULTY DOING ERRANDS ALONE?: NO

## 2023-07-14 SDOH — ECONOMIC STABILITY: GENERAL

## 2023-07-14 ASSESSMENT — ACTIVITIES OF DAILY LIVING (ADL)
RECENT_DECLINE_ADL: NO
ADL_SCORE: 12
ADL_SHORT_OF_BREATH: NO
ADL_BEFORE_ADMISSION: INDEPENDENT

## 2023-07-14 ASSESSMENT — LIFESTYLE VARIABLES
HOW OFTEN DO YOU HAVE A DRINK CONTAINING ALCOHOL: NEVER
AUDIT-C TOTAL SCORE: 0
HOW OFTEN DO YOU HAVE 6 OR MORE DRINKS ON ONE OCCASION: NEVER
ALCOHOL_USE_STATUS: NO OR LOW RISK WITH VALIDATED TOOL
HOW MANY STANDARD DRINKS CONTAINING ALCOHOL DO YOU HAVE ON A TYPICAL DAY: 0,1 OR 2

## 2023-07-14 ASSESSMENT — ENCOUNTER SYMPTOMS: EXERCISE TOLERANCE: GOOD (>4 METS)

## 2023-07-14 ASSESSMENT — PAIN SCALES - GENERAL: PAINLEVEL_OUTOF10: 0

## 2023-07-19 ENCOUNTER — HOSPITAL ENCOUNTER (INPATIENT)
Age: 38
LOS: 2 days | Discharge: HOME | End: 2023-07-21
Attending: OBSTETRICS & GYNECOLOGY | Admitting: OBSTETRICS & GYNECOLOGY

## 2023-07-19 ENCOUNTER — ANESTHESIA (OUTPATIENT)
Dept: OBGYN | Age: 38
End: 2023-07-19

## 2023-07-19 ENCOUNTER — TELEPHONE (OUTPATIENT)
Dept: OBGYN | Age: 38
End: 2023-07-19

## 2023-07-19 ENCOUNTER — ANESTHESIA EVENT (OUTPATIENT)
Dept: OBGYN | Age: 38
End: 2023-07-19

## 2023-07-19 PROBLEM — Z3A.39 39 WEEKS GESTATION OF PREGNANCY: Status: ACTIVE | Noted: 2023-07-19

## 2023-07-19 LAB
ABO + RH BLD: NORMAL
BLD GP AB SCN SERPL QL GEL: NEGATIVE
DEPRECATED RDW RBC: 44.3 FL (ref 39–50)
ERYTHROCYTE [DISTWIDTH] IN BLOOD: 13.5 % (ref 11–15)
HCT VFR BLD CALC: 35 % (ref 36–46.5)
HGB BLD-MCNC: 12 G/DL (ref 12–15.5)
MCH RBC QN AUTO: 31.4 PG (ref 26–34)
MCHC RBC AUTO-ENTMCNC: 34.3 G/DL (ref 32–36.5)
MCV RBC AUTO: 91.6 FL (ref 78–100)
NRBC BLD MANUAL-RTO: 0 /100 WBC
PLATELET # BLD AUTO: 158 K/MCL (ref 140–450)
RBC # BLD: 3.82 MIL/MCL (ref 4–5.2)
TYPE AND SCREEN EXPIRATION DATE: NORMAL
WBC # BLD: 8.3 K/MCL (ref 4.2–11)

## 2023-07-19 PROCEDURE — 10002800 HB RX 250 W HCPCS: Performed by: ADVANCED PRACTICE MIDWIFE

## 2023-07-19 PROCEDURE — 86850 RBC ANTIBODY SCREEN: CPT | Performed by: ADVANCED PRACTICE MIDWIFE

## 2023-07-19 PROCEDURE — 13000012 HB ANESTHESIA SPINAL/EPIDURAL IN L&D: Performed by: ANESTHESIOLOGY

## 2023-07-19 PROCEDURE — 10002801 HB RX 250 W/O HCPCS: Performed by: INTERNAL MEDICINE

## 2023-07-19 PROCEDURE — 13003286 HB ROOM CHARGE L&D

## 2023-07-19 PROCEDURE — 13000001 HB PHASE II RECOVERY EA 30 MINUTES

## 2023-07-19 PROCEDURE — 10002807 HB RX 258: Performed by: ADVANCED PRACTICE MIDWIFE

## 2023-07-19 PROCEDURE — 85027 COMPLETE CBC AUTOMATED: CPT | Performed by: ADVANCED PRACTICE MIDWIFE

## 2023-07-19 PROCEDURE — X1094 NO CHARGE VISIT: HCPCS | Performed by: ADVANCED PRACTICE MIDWIFE

## 2023-07-19 PROCEDURE — 13003251 HB VAGINAL DELIVERY HIGH RISK

## 2023-07-19 PROCEDURE — 99205 OFFICE O/P NEW HI 60 MIN: CPT

## 2023-07-19 PROCEDURE — 13001455 HB PERINATAL CARE HIGH RISK

## 2023-07-19 RX ORDER — 0.9 % SODIUM CHLORIDE 0.9 %
2 VIAL (ML) INJECTION EVERY 12 HOURS SCHEDULED
Status: DISCONTINUED | OUTPATIENT
Start: 2023-07-19 | End: 2023-07-21 | Stop reason: HOSPADM

## 2023-07-19 RX ORDER — LIDOCAINE HYDROCHLORIDE AND EPINEPHRINE 15; 5 MG/ML; UG/ML
INJECTION, SOLUTION EPIDURAL
Status: COMPLETED | OUTPATIENT
Start: 2023-07-19 | End: 2023-07-19

## 2023-07-19 RX ORDER — EPHEDRINE SULFATE/0.9% NACL/PF 50 MG/10ML
5 SYRINGE (ML) INTRAVENOUS
Status: DISCONTINUED | OUTPATIENT
Start: 2023-07-19 | End: 2023-07-21 | Stop reason: HOSPADM

## 2023-07-19 RX ORDER — CALCIUM CARBONATE 500 MG/1
500 TABLET, CHEWABLE ORAL EVERY 4 HOURS PRN
Status: DISCONTINUED | OUTPATIENT
Start: 2023-07-19 | End: 2023-07-20 | Stop reason: SDUPTHER

## 2023-07-19 RX ORDER — SODIUM CHLORIDE, SODIUM LACTATE, POTASSIUM CHLORIDE, CALCIUM CHLORIDE 600; 310; 30; 20 MG/100ML; MG/100ML; MG/100ML; MG/100ML
INJECTION, SOLUTION INTRAVENOUS CONTINUOUS
Status: DISCONTINUED | OUTPATIENT
Start: 2023-07-19 | End: 2023-07-21 | Stop reason: HOSPADM

## 2023-07-19 RX ORDER — ONDANSETRON 2 MG/ML
4 INJECTION INTRAMUSCULAR; INTRAVENOUS 2 TIMES DAILY PRN
Status: DISCONTINUED | OUTPATIENT
Start: 2023-07-19 | End: 2023-07-20 | Stop reason: SDUPTHER

## 2023-07-19 RX ORDER — FENTANYL/ROPIVACAINE/NS/PF 2MCG/ML-.1
PLASTIC BAG, INJECTION (ML) EPIDURAL CONTINUOUS
Status: DISCONTINUED | OUTPATIENT
Start: 2023-07-19 | End: 2023-07-21 | Stop reason: HOSPADM

## 2023-07-19 RX ORDER — OXYTOCIN-SODIUM CHLORIDE 0.9% IV SOLN 30 UNIT/500ML 30-0.9/5 UT/ML-%
0-334 SOLUTION INTRAVENOUS CONTINUOUS
Status: DISCONTINUED | OUTPATIENT
Start: 2023-07-19 | End: 2023-07-21 | Stop reason: HOSPADM

## 2023-07-19 RX ORDER — EPHEDRINE SULFATE/0.9% NACL/PF 50 MG/10ML
10 SYRINGE (ML) INTRAVENOUS
Status: DISCONTINUED | OUTPATIENT
Start: 2023-07-19 | End: 2023-07-21 | Stop reason: HOSPADM

## 2023-07-19 RX ORDER — LIDOCAINE HYDROCHLORIDE 10 MG/ML
30 INJECTION, SOLUTION EPIDURAL; INFILTRATION; INTRACAUDAL; PERINEURAL
Status: DISCONTINUED | OUTPATIENT
Start: 2023-07-19 | End: 2023-07-20

## 2023-07-19 RX ORDER — OXYTOCIN 10 [USP'U]/ML
10 INJECTION, SOLUTION INTRAMUSCULAR; INTRAVENOUS
Status: DISCONTINUED | OUTPATIENT
Start: 2023-07-19 | End: 2023-07-20

## 2023-07-19 RX ORDER — OXYTOCIN/0.9 % SODIUM CHLORIDE 30/500 ML
0-20 PLASTIC BAG, INJECTION (ML) INTRAVENOUS CONTINUOUS
Status: DISCONTINUED | OUTPATIENT
Start: 2023-07-19 | End: 2023-07-20

## 2023-07-19 RX ADMIN — Medication 12 ML/HR: at 20:50

## 2023-07-19 RX ADMIN — Medication 2 MILLI-UNITS/MIN: at 18:02

## 2023-07-19 RX ADMIN — SODIUM CHLORIDE, POTASSIUM CHLORIDE, SODIUM LACTATE AND CALCIUM CHLORIDE: 600; 310; 30; 20 INJECTION, SOLUTION INTRAVENOUS at 20:32

## 2023-07-19 RX ADMIN — SODIUM CHLORIDE, POTASSIUM CHLORIDE, SODIUM LACTATE AND CALCIUM CHLORIDE: 600; 310; 30; 20 INJECTION, SOLUTION INTRAVENOUS at 16:43

## 2023-07-19 RX ADMIN — LIDOCAINE HYDROCHLORIDE,EPINEPHRINE BITARTRATE 3 ML: 15; .005 INJECTION, SOLUTION EPIDURAL; INFILTRATION; INTRACAUDAL; PERINEURAL at 20:41

## 2023-07-19 SDOH — ECONOMIC STABILITY: GENERAL

## 2023-07-19 SDOH — SOCIAL STABILITY: SOCIAL INSECURITY: RISK FACTORS: HEMATOLOGICAL DISEASE

## 2023-07-19 SDOH — HEALTH STABILITY: GENERAL: BECAUSE OF A PHYSICAL, MENTAL, OR EMOTIONAL CONDITION, DO YOU HAVE DIFFICULTY DOING ERRANDS ALONE?: NO

## 2023-07-19 SDOH — ECONOMIC STABILITY: HOUSING INSECURITY: ARE YOU WORRIED ABOUT LOSING YOUR HOUSING?: NO

## 2023-07-19 SDOH — SOCIAL STABILITY: SOCIAL NETWORK
HOW OFTEN DO YOU SEE OR TALK TO PEOPLE THAT YOU CARE ABOUT AND FEEL CLOSE TO? (FOR EXAMPLE: TALKING TO FRIENDS ON THE PHONE, VISITING FRIENDS OR FAMILY, GOING TO CHURCH OR CLUB MEETINGS): 3 TO 5 TIMES A WEEK

## 2023-07-19 SDOH — ECONOMIC STABILITY: HOUSING INSECURITY: WHAT IS YOUR LIVING SITUATION TODAY?: SPOUSE;CHILDREN

## 2023-07-19 SDOH — ECONOMIC STABILITY: FOOD INSECURITY: HOW OFTEN IN THE PAST 12 MONTHS WERE YOU WORRIED OR STRESSED ABOUT HAVING ENOUGH MONEY TO BUY NUTRITIOUS MEALS?: NEVER

## 2023-07-19 SDOH — HEALTH STABILITY: PHYSICAL HEALTH: DO YOU HAVE DIFFICULTY DRESSING OR BATHING?: NO

## 2023-07-19 SDOH — ECONOMIC STABILITY: HOUSING INSECURITY: WHAT IS YOUR LIVING SITUATION TODAY?: HOUSE

## 2023-07-19 SDOH — HEALTH STABILITY: PHYSICAL HEALTH: DO YOU HAVE SERIOUS DIFFICULTY WALKING OR CLIMBING STAIRS?: NO

## 2023-07-19 SDOH — SOCIAL STABILITY: SOCIAL NETWORK: SUPPORT SYSTEMS: FAMILY MEMBERS;SPOUSE

## 2023-07-19 ASSESSMENT — PAIN SCALES - GENERAL
PAINLEVEL_OUTOF10: 8
PAINLEVEL_OUTOF10: 8
PAINLEVEL_OUTOF10: 3
PAINLEVEL_OUTOF10: 0
PAINLEVEL_OUTOF10: 1
PAINLEVEL_OUTOF10: 6
PAINLEVEL_OUTOF10: 0

## 2023-07-19 ASSESSMENT — LIFESTYLE VARIABLES
HOW OFTEN DO YOU HAVE 6 OR MORE DRINKS ON ONE OCCASION: NEVER
ALCOHOL_USE_STATUS: NO OR LOW RISK WITH VALIDATED TOOL
HOW OFTEN DO YOU HAVE A DRINK CONTAINING ALCOHOL: NEVER
AUDIT-C TOTAL SCORE: 0
HOW MANY STANDARD DRINKS CONTAINING ALCOHOL DO YOU HAVE ON A TYPICAL DAY: 0,1 OR 2

## 2023-07-19 ASSESSMENT — ACTIVITIES OF DAILY LIVING (ADL)
ADL_SHORT_OF_BREATH: NO
RECENT_DECLINE_ADL: NO
ADL_SCORE: 12
ADL_BEFORE_ADMISSION: INDEPENDENT

## 2023-07-19 ASSESSMENT — ENCOUNTER SYMPTOMS: EXERCISE TOLERANCE: GOOD (>4 METS)

## 2023-07-20 ENCOUNTER — APPOINTMENT (OUTPATIENT)
Dept: MATERNAL FETAL MEDICINE | Age: 38
End: 2023-07-20
Attending: ADVANCED PRACTICE MIDWIFE

## 2023-07-20 PROCEDURE — 59400 OBSTETRICAL CARE: CPT

## 2023-07-20 PROCEDURE — 10000003 HB ROOM CHARGE WOMEN'S HEALTH

## 2023-07-20 PROCEDURE — 0UQMXZZ REPAIR VULVA, EXTERNAL APPROACH: ICD-10-PCS

## 2023-07-20 PROCEDURE — 0KQM0ZZ REPAIR PERINEUM MUSCLE, OPEN APPROACH: ICD-10-PCS

## 2023-07-20 PROCEDURE — 10002803 HB RX 637

## 2023-07-20 PROCEDURE — 10004651 HB RX, NO CHARGE ITEM

## 2023-07-20 RX ORDER — CALCIUM CARBONATE 500 MG/1
500 TABLET, CHEWABLE ORAL EVERY 4 HOURS PRN
Status: DISCONTINUED | OUTPATIENT
Start: 2023-07-20 | End: 2023-07-21 | Stop reason: HOSPADM

## 2023-07-20 RX ORDER — SIMETHICONE 125 MG
125 TABLET,CHEWABLE ORAL EVERY 4 HOURS PRN
Status: DISCONTINUED | OUTPATIENT
Start: 2023-07-20 | End: 2023-07-21 | Stop reason: HOSPADM

## 2023-07-20 RX ORDER — POLYETHYLENE GLYCOL 3350 17 G/17G
17 POWDER, FOR SOLUTION ORAL DAILY PRN
Status: DISCONTINUED | OUTPATIENT
Start: 2023-07-20 | End: 2023-07-21 | Stop reason: HOSPADM

## 2023-07-20 RX ORDER — DIPHENHYDRAMINE HCL 25 MG
50 CAPSULE ORAL ONCE
Status: DISCONTINUED | OUTPATIENT
Start: 2023-07-20 | End: 2023-07-21 | Stop reason: HOSPADM

## 2023-07-20 RX ORDER — OXYTOCIN-SODIUM CHLORIDE 0.9% IV SOLN 30 UNIT/500ML 30-0.9/5 UT/ML-%
0-334 SOLUTION INTRAVENOUS CONTINUOUS
Status: DISCONTINUED | OUTPATIENT
Start: 2023-07-20 | End: 2023-07-20

## 2023-07-20 RX ORDER — ONDANSETRON 2 MG/ML
4 INJECTION INTRAMUSCULAR; INTRAVENOUS 2 TIMES DAILY PRN
Status: DISCONTINUED | OUTPATIENT
Start: 2023-07-20 | End: 2023-07-21 | Stop reason: HOSPADM

## 2023-07-20 RX ORDER — AMOXICILLIN 250 MG
2 CAPSULE ORAL 2 TIMES DAILY PRN
Status: DISCONTINUED | OUTPATIENT
Start: 2023-07-20 | End: 2023-07-21 | Stop reason: HOSPADM

## 2023-07-20 RX ORDER — IBUPROFEN 600 MG/1
TABLET ORAL
Status: DISPENSED
Start: 2023-07-20 | End: 2023-07-20

## 2023-07-20 RX ORDER — VITAMIN A, VITAMIN C, VITAMIN D-3, VITAMIN E, VITAMIN B-1, VITAMIN B-2, NIACIN, VITAMIN B-6, CALCIUM, IRON, ZINC, COPPER 4000; 120; 400; 22; 1.84; 3; 20; 10; 1; 12; 200; 27; 25; 2 [IU]/1; MG/1; [IU]/1; MG/1; MG/1; MG/1; MG/1; MG/1; MG/1; UG/1; MG/1; MG/1; MG/1; MG/1
1 TABLET ORAL DAILY
Status: DISCONTINUED | OUTPATIENT
Start: 2023-07-20 | End: 2023-07-21 | Stop reason: HOSPADM

## 2023-07-20 RX ORDER — HYDROCORTISONE ACETATE PRAMOXINE HCL 2.5; 1 G/100G; G/100G
CREAM TOPICAL 3 TIMES DAILY PRN
Status: DISCONTINUED | OUTPATIENT
Start: 2023-07-20 | End: 2023-07-21 | Stop reason: HOSPADM

## 2023-07-20 RX ORDER — ACETAMINOPHEN 325 MG/1
650 TABLET ORAL EVERY 6 HOURS PRN
Status: DISCONTINUED | OUTPATIENT
Start: 2023-07-20 | End: 2023-07-21 | Stop reason: HOSPADM

## 2023-07-20 RX ORDER — IBUPROFEN 600 MG/1
600 TABLET ORAL EVERY 6 HOURS PRN
Status: DISCONTINUED | OUTPATIENT
Start: 2023-07-20 | End: 2023-07-21 | Stop reason: HOSPADM

## 2023-07-20 RX ADMIN — ACETAMINOPHEN 650 MG: 325 TABLET ORAL at 18:02

## 2023-07-20 RX ADMIN — VITAMIN A, VITAMIN C, VITAMIN D, VITAMIN E, THIAMINE, RIBOFLAVIN, NIACIN, VITAMIN B6, FOLIC ACID, VITAMIN B12, CALCIUM, IRON, ZINC, COPPER 1 TABLET: 4000; 120; 400; 22; 1.84; 3; 20; 10; 1; 12; 200; 27; 25; 2 TABLET ORAL at 08:21

## 2023-07-20 RX ADMIN — IBUPROFEN 600 MG: 600 TABLET, FILM COATED ORAL at 08:19

## 2023-07-20 RX ADMIN — SERTRALINE HYDROCHLORIDE 150 MG: 50 TABLET, FILM COATED ORAL at 08:27

## 2023-07-20 RX ADMIN — ACETAMINOPHEN 650 MG: 325 TABLET ORAL at 04:42

## 2023-07-20 RX ADMIN — IBUPROFEN 600 MG: 600 TABLET, FILM COATED ORAL at 22:19

## 2023-07-20 RX ADMIN — ACETAMINOPHEN 650 MG: 325 TABLET ORAL at 23:59

## 2023-07-20 RX ADMIN — IBUPROFEN 600 MG: 600 TABLET, FILM COATED ORAL at 01:34

## 2023-07-20 RX ADMIN — IBUPROFEN 600 MG: 600 TABLET, FILM COATED ORAL at 15:40

## 2023-07-20 RX ADMIN — ACETAMINOPHEN 650 MG: 325 TABLET ORAL at 11:16

## 2023-07-20 ASSESSMENT — PAIN SCALES - GENERAL
PAINLEVEL_OUTOF10: 2
PAINLEVEL_OUTOF10: 0
PAINLEVEL_OUTOF10: 2
PAINLEVEL_OUTOF10: 0
PAINLEVEL_OUTOF10: 2
PAINLEVEL_OUTOF10: 0
PAINLEVEL_OUTOF10: 1
PAINLEVEL_OUTOF10: 0
PAINLEVEL_OUTOF10: 2

## 2023-07-20 ASSESSMENT — PAIN SCALES - WONG BAKER
WONGBAKER_NUMERICALRESPONSE: 2
WONGBAKER_NUMERICALRESPONSE: 2

## 2023-07-20 ASSESSMENT — PATIENT HEALTH QUESTIONNAIRE - PHQ9
IS PATIENT ABLE TO COMPLETE PHQ2 OR PHQ9: NO, DEFER TO LATER TIME
IS PATIENT ABLE TO COMPLETE PHQ2 OR PHQ9: NO, DEFER TO LATER TIME

## 2023-07-21 VITALS
RESPIRATION RATE: 16 BRPM | HEIGHT: 66 IN | SYSTOLIC BLOOD PRESSURE: 121 MMHG | WEIGHT: 178.57 LBS | TEMPERATURE: 98.4 F | BODY MASS INDEX: 28.7 KG/M2 | DIASTOLIC BLOOD PRESSURE: 82 MMHG | OXYGEN SATURATION: 97 % | HEART RATE: 89 BPM

## 2023-07-21 LAB
DEPRECATED RDW RBC: 46 FL (ref 39–50)
ERYTHROCYTE [DISTWIDTH] IN BLOOD: 13.5 % (ref 11–15)
HCT VFR BLD CALC: 34 % (ref 36–46.5)
HGB BLD-MCNC: 11.1 G/DL (ref 12–15.5)
MCH RBC QN AUTO: 30.5 PG (ref 26–34)
MCHC RBC AUTO-ENTMCNC: 32.6 G/DL (ref 32–36.5)
MCV RBC AUTO: 93.4 FL (ref 78–100)
NRBC BLD MANUAL-RTO: 0 /100 WBC
PLATELET # BLD AUTO: 141 K/MCL (ref 140–450)
RBC # BLD: 3.64 MIL/MCL (ref 4–5.2)
WBC # BLD: 6.8 K/MCL (ref 4.2–11)

## 2023-07-21 PROCEDURE — 99024 POSTOP FOLLOW-UP VISIT: CPT

## 2023-07-21 PROCEDURE — 85027 COMPLETE CBC AUTOMATED: CPT

## 2023-07-21 PROCEDURE — 10004651 HB RX, NO CHARGE ITEM: Performed by: ADVANCED PRACTICE MIDWIFE

## 2023-07-21 PROCEDURE — 10004651 HB RX, NO CHARGE ITEM

## 2023-07-21 PROCEDURE — 36415 COLL VENOUS BLD VENIPUNCTURE: CPT

## 2023-07-21 PROCEDURE — 10002803 HB RX 637

## 2023-07-21 RX ORDER — POLYETHYLENE GLYCOL 3350 17 G/17G
17 POWDER, FOR SOLUTION ORAL DAILY PRN
Status: SHIPPED | COMMUNITY
Start: 2023-07-21

## 2023-07-21 RX ORDER — ACETAMINOPHEN 325 MG/1
650 TABLET ORAL EVERY 4 HOURS PRN
Status: SHIPPED | COMMUNITY
Start: 2023-07-21

## 2023-07-21 RX ORDER — IBUPROFEN 600 MG/1
600 TABLET ORAL EVERY 6 HOURS PRN
Status: SHIPPED | COMMUNITY
Start: 2023-07-21

## 2023-07-21 RX ADMIN — SODIUM CHLORIDE, PRESERVATIVE FREE 2 ML: 5 INJECTION INTRAVENOUS at 06:21

## 2023-07-21 RX ADMIN — IBUPROFEN 600 MG: 600 TABLET, FILM COATED ORAL at 09:08

## 2023-07-21 RX ADMIN — SENNOSIDES AND DOCUSATE SODIUM 2 TABLET: 50; 8.6 TABLET ORAL at 09:08

## 2023-07-21 RX ADMIN — ACETAMINOPHEN 650 MG: 325 TABLET ORAL at 06:19

## 2023-07-21 ASSESSMENT — PAIN SCALES - GENERAL
PAINLEVEL_OUTOF10: 2
PAINLEVEL_OUTOF10: 2

## 2023-08-02 ENCOUNTER — APPOINTMENT (OUTPATIENT)
Dept: OBGYN | Age: 38
End: 2023-08-02

## 2023-08-04 ENCOUNTER — TELEPHONE (OUTPATIENT)
Dept: OBGYN | Age: 38
End: 2023-08-04

## 2023-08-04 ENCOUNTER — TELEPHONIC VISIT (OUTPATIENT)
Dept: OBGYN | Age: 38
End: 2023-08-04

## 2023-08-04 ENCOUNTER — E-ADVICE (OUTPATIENT)
Dept: OBGYN | Age: 38
End: 2023-08-04

## 2023-08-04 PROCEDURE — 99024 POSTOP FOLLOW-UP VISIT: CPT | Performed by: ADVANCED PRACTICE MIDWIFE

## 2023-08-31 ENCOUNTER — POSTPARTUM VISIT (OUTPATIENT)
Dept: OBGYN | Age: 38
End: 2023-08-31

## 2023-08-31 VITALS
WEIGHT: 150.13 LBS | DIASTOLIC BLOOD PRESSURE: 66 MMHG | HEIGHT: 66 IN | BODY MASS INDEX: 24.13 KG/M2 | RESPIRATION RATE: 18 BRPM | HEART RATE: 89 BPM | SYSTOLIC BLOOD PRESSURE: 122 MMHG

## 2023-08-31 DIAGNOSIS — N39.46 MIXED STRESS AND URGE URINARY INCONTINENCE: ICD-10-CM

## 2023-08-31 PROBLEM — O09.529 AMA (ADVANCED MATERNAL AGE) MULTIGRAVIDA 35+ (CMD): Status: RESOLVED | Noted: 2022-12-23 | Resolved: 2023-08-31

## 2023-08-31 PROBLEM — Z3A.39 39 WEEKS GESTATION OF PREGNANCY (CMD): Status: RESOLVED | Noted: 2023-07-19 | Resolved: 2023-08-31

## 2023-08-31 PROBLEM — O32.0XX1: Status: RESOLVED | Noted: 2023-07-14 | Resolved: 2023-08-31

## 2023-08-31 PROBLEM — O22.42 HEMORRHOIDS DURING PREGNANCY IN SECOND TRIMESTER: Status: RESOLVED | Noted: 2023-02-21 | Resolved: 2023-08-31

## 2023-08-31 PROCEDURE — 0503F POSTPARTUM CARE VISIT: CPT | Performed by: ADVANCED PRACTICE MIDWIFE

## 2023-08-31 ASSESSMENT — EDINBURGH POSTNATAL DEPRESSION SCALE (EPDS)
I HAVE LOOKED FORWARD WITH ENJOYMENT TO THINGS: AS MUCH AS I EVER DID
THINGS HAVE BEEN GETTING ON TOP OF ME: NO, MOST OF THE TIME I HAVE COPED QUITE WELL
I HAVE FELT SCARED OR PANICKY FOR NO GOOD REASON: NO, NOT MUCH
TOTAL SCORE: 7
THE THOUGHT OF HARMING MYSELF HAS OCCURRED TO ME: NEVER
I HAVE BEEN ANXIOUS OR WORRIED FOR NO GOOD REASON: HARDLY EVER
I HAVE FELT SAD OR MISERABLE: NOT VERY OFTEN
I HAVE BEEN SO UNHAPPY THAT I HAVE HAD DIFFICULTY SLEEPING: NOT VERY OFTEN
I HAVE BEEN SO UNHAPPY THAT I HAVE BEEN CRYING: ONLY OCCASIONALLY
I HAVE BLAMED MYSELF UNNECESSARILY WHEN THINGS WENT WRONG: NOT VERY OFTEN
I HAVE BEEN ABLE TO LAUGH AND SEE THE FUNNY SIDE OF THINGS: AS MUCH AS I ALWAYS COULD

## 2023-08-31 ASSESSMENT — PAIN SCALES - GENERAL: PAINLEVEL: 0

## 2023-09-07 ENCOUNTER — APPOINTMENT (OUTPATIENT)
Dept: OBGYN | Age: 38
End: 2023-09-07

## 2023-09-22 ENCOUNTER — APPOINTMENT (OUTPATIENT)
Dept: PHYSICAL MEDICINE AND REHAB | Age: 38
End: 2023-09-22

## 2023-10-06 ENCOUNTER — APPOINTMENT (OUTPATIENT)
Dept: PHYSICAL MEDICINE AND REHAB | Age: 38
End: 2023-10-06

## 2023-10-12 ENCOUNTER — POSTPARTUM VISIT (OUTPATIENT)
Dept: OBGYN | Age: 38
End: 2023-10-12

## 2023-10-12 VITALS
HEART RATE: 71 BPM | HEIGHT: 66 IN | BODY MASS INDEX: 23.74 KG/M2 | TEMPERATURE: 97 F | DIASTOLIC BLOOD PRESSURE: 66 MMHG | SYSTOLIC BLOOD PRESSURE: 117 MMHG | RESPIRATION RATE: 18 BRPM | WEIGHT: 147.71 LBS

## 2023-10-12 DIAGNOSIS — Z23 NEED FOR INFLUENZA VACCINATION: ICD-10-CM

## 2023-10-12 DIAGNOSIS — N39.46 MIXED STRESS AND URGE URINARY INCONTINENCE: ICD-10-CM

## 2023-10-12 DIAGNOSIS — L92.9 GRANULATION TISSUE AT OBSTETRICAL LACERATION SITE: Primary | ICD-10-CM

## 2023-10-12 PROCEDURE — 90471 IMMUNIZATION ADMIN: CPT | Performed by: ADVANCED PRACTICE MIDWIFE

## 2023-10-12 PROCEDURE — 99213 OFFICE O/P EST LOW 20 MIN: CPT | Performed by: ADVANCED PRACTICE MIDWIFE

## 2023-10-12 PROCEDURE — 90686 IIV4 VACC NO PRSV 0.5 ML IM: CPT | Performed by: ADVANCED PRACTICE MIDWIFE

## 2023-10-12 ASSESSMENT — EDINBURGH POSTNATAL DEPRESSION SCALE (EPDS)
I HAVE BLAMED MYSELF UNNECESSARILY WHEN THINGS WENT WRONG: NOT VERY OFTEN
I HAVE BEEN ABLE TO LAUGH AND SEE THE FUNNY SIDE OF THINGS: AS MUCH AS I ALWAYS COULD
I HAVE BEEN SO UNHAPPY THAT I HAVE BEEN CRYING: NO, NEVER
THINGS HAVE BEEN GETTING ON TOP OF ME: YES, SOMETIMES I HAVEN'T BEEN COPING AS WELL AS USUAL
I HAVE BEEN SO UNHAPPY THAT I HAVE HAD DIFFICULTY SLEEPING: NOT VERY OFTEN
I HAVE FELT SAD OR MISERABLE: NOT VERY OFTEN
I HAVE FELT SCARED OR PANICKY FOR NO GOOD REASON: NO, NOT AT ALL
I HAVE LOOKED FORWARD WITH ENJOYMENT TO THINGS: AS MUCH AS I EVER DID
THE THOUGHT OF HARMING MYSELF HAS OCCURRED TO ME: NEVER
TOTAL SCORE: 5
I HAVE BEEN ANXIOUS OR WORRIED FOR NO GOOD REASON: NO, NOT AT ALL

## 2023-10-12 ASSESSMENT — PAIN SCALES - GENERAL: PAINLEVEL: 0

## 2023-10-19 ENCOUNTER — WALK IN (OUTPATIENT)
Dept: URGENT CARE | Age: 38
End: 2023-10-19

## 2023-10-19 VITALS
TEMPERATURE: 98 F | WEIGHT: 147.71 LBS | BODY MASS INDEX: 23.86 KG/M2 | RESPIRATION RATE: 18 BRPM | OXYGEN SATURATION: 97 % | HEART RATE: 82 BPM

## 2023-10-19 DIAGNOSIS — J02.9 ACUTE PHARYNGITIS, UNSPECIFIED ETIOLOGY: ICD-10-CM

## 2023-10-19 DIAGNOSIS — R68.89 FLU-LIKE SYMPTOMS: Primary | ICD-10-CM

## 2023-10-19 LAB
FLUAV RNA RESP QL NAA+PROBE: NOT DETECTED
FLUBV RNA RESP QL NAA+PROBE: NOT DETECTED
RSV AG NPH QL IA.RAPID: NOT DETECTED
S PYO DNA THROAT QL NAA+PROBE: NOT DETECTED
SARS-COV-2 RNA RESP QL NAA+PROBE: NOT DETECTED
TEST LOT EXPIRATION DATE: NORMAL
TEST LOT NUMBER: NORMAL

## 2023-10-19 PROCEDURE — 87651 STREP A DNA AMP PROBE: CPT | Performed by: INTERNAL MEDICINE

## 2023-10-19 PROCEDURE — 99204 OFFICE O/P NEW MOD 45 MIN: CPT | Performed by: INTERNAL MEDICINE

## 2023-10-19 PROCEDURE — 0241U POCT COVID/FLU/RSV PANEL: CPT | Performed by: INTERNAL MEDICINE

## 2023-10-19 RX ORDER — AMOXICILLIN 500 MG/1
500 CAPSULE ORAL 3 TIMES DAILY
Qty: 21 CAPSULE | Refills: 0 | Status: SHIPPED | OUTPATIENT
Start: 2023-10-19 | End: 2023-10-26

## 2023-10-19 ASSESSMENT — ENCOUNTER SYMPTOMS
EYES NEGATIVE: 1
PSYCHIATRIC NEGATIVE: 1
NEUROLOGICAL NEGATIVE: 1
COUGH: 1
GASTROINTESTINAL NEGATIVE: 1
SORE THROAT: 1
CONSTITUTIONAL NEGATIVE: 1

## 2023-11-02 ENCOUNTER — HOSPITAL ENCOUNTER (OUTPATIENT)
Dept: PHYSICAL MEDICINE AND REHAB | Age: 38
Discharge: STILL ADMITTED OR ADMINISTRATIVE | End: 2023-11-02

## 2023-11-02 PROCEDURE — 97161 PT EVAL LOW COMPLEX 20 MIN: CPT | Performed by: PHYSICAL THERAPIST

## 2023-11-02 PROCEDURE — 97110 THERAPEUTIC EXERCISES: CPT | Performed by: PHYSICAL THERAPIST

## 2023-11-02 ASSESSMENT — ENCOUNTER SYMPTOMS: ALLEVIATING FACTORS: AVOIDS AGGRAVATING ACTIVITY

## 2023-11-13 ENCOUNTER — E-ADVICE (OUTPATIENT)
Dept: PHYSICAL MEDICINE AND REHAB | Age: 38
End: 2023-11-13

## 2023-11-17 ENCOUNTER — APPOINTMENT (OUTPATIENT)
Dept: PHYSICAL MEDICINE AND REHAB | Age: 38
End: 2023-11-17

## 2023-11-17 ENCOUNTER — HOSPITAL ENCOUNTER (OUTPATIENT)
Dept: PHYSICAL MEDICINE AND REHAB | Age: 38
Discharge: HOME | End: 2023-11-17

## 2023-11-17 PROCEDURE — 97112 NEUROMUSCULAR REEDUCATION: CPT | Performed by: PHYSICAL THERAPIST

## 2023-11-17 PROCEDURE — 97110 THERAPEUTIC EXERCISES: CPT | Performed by: PHYSICAL THERAPIST

## 2023-11-22 ENCOUNTER — APPOINTMENT (OUTPATIENT)
Dept: PHYSICAL MEDICINE AND REHAB | Age: 38
End: 2023-11-22

## 2023-11-29 ENCOUNTER — APPOINTMENT (OUTPATIENT)
Dept: PHYSICAL MEDICINE AND REHAB | Age: 38
End: 2023-11-29

## 2023-11-29 PROCEDURE — 97110 THERAPEUTIC EXERCISES: CPT | Performed by: PHYSICAL THERAPIST

## 2024-02-15 ENCOUNTER — APPOINTMENT (OUTPATIENT)
Dept: OBGYN | Age: 39
End: 2024-02-15

## 2024-02-15 ENCOUNTER — E-ADVICE (OUTPATIENT)
Dept: OTHER | Age: 39
End: 2024-02-15

## 2024-03-04 ENCOUNTER — WALK IN (OUTPATIENT)
Dept: URGENT CARE | Age: 39
End: 2024-03-04

## 2024-03-04 VITALS
BODY MASS INDEX: 22.82 KG/M2 | DIASTOLIC BLOOD PRESSURE: 72 MMHG | RESPIRATION RATE: 16 BRPM | TEMPERATURE: 99 F | WEIGHT: 141.98 LBS | HEART RATE: 84 BPM | OXYGEN SATURATION: 98 % | HEIGHT: 66 IN | SYSTOLIC BLOOD PRESSURE: 114 MMHG

## 2024-03-04 DIAGNOSIS — B34.9 PHARYNGITIS WITH VIRAL SYNDROME: ICD-10-CM

## 2024-03-04 DIAGNOSIS — J02.9 PHARYNGITIS WITH VIRAL SYNDROME: ICD-10-CM

## 2024-03-04 DIAGNOSIS — J02.9 SORE THROAT: Primary | ICD-10-CM

## 2024-03-04 LAB
FLUAV RNA RESP QL NAA+PROBE: NOT DETECTED
FLUBV RNA RESP QL NAA+PROBE: NOT DETECTED
RSV AG NPH QL IA.RAPID: NOT DETECTED
S PYO DNA THROAT QL NAA+PROBE: NOT DETECTED
SARS-COV-2 RNA RESP QL NAA+PROBE: NOT DETECTED
TEST LOT EXPIRATION DATE: 0
TEST LOT NUMBER: 0

## 2024-03-04 PROCEDURE — 0241U POCT COVID/FLU/RSV PANEL: CPT | Performed by: STUDENT IN AN ORGANIZED HEALTH CARE EDUCATION/TRAINING PROGRAM

## 2024-03-04 PROCEDURE — 99213 OFFICE O/P EST LOW 20 MIN: CPT | Performed by: STUDENT IN AN ORGANIZED HEALTH CARE EDUCATION/TRAINING PROGRAM

## 2024-03-04 PROCEDURE — 87651 STREP A DNA AMP PROBE: CPT | Performed by: STUDENT IN AN ORGANIZED HEALTH CARE EDUCATION/TRAINING PROGRAM

## 2024-03-04 ASSESSMENT — ENCOUNTER SYMPTOMS
SORE THROAT: 1
CONSTITUTIONAL NEGATIVE: 1
EYES NEGATIVE: 1
NEUROLOGICAL NEGATIVE: 1
COUGH: 1
PSYCHIATRIC NEGATIVE: 1
GASTROINTESTINAL NEGATIVE: 1

## 2024-03-08 ENCOUNTER — APPOINTMENT (OUTPATIENT)
Dept: OBGYN | Age: 39
End: 2024-03-08

## 2024-03-08 VITALS
BODY MASS INDEX: 23.03 KG/M2 | SYSTOLIC BLOOD PRESSURE: 105 MMHG | HEIGHT: 66 IN | WEIGHT: 143.3 LBS | HEART RATE: 56 BPM | DIASTOLIC BLOOD PRESSURE: 65 MMHG | OXYGEN SATURATION: 100 %

## 2024-03-08 DIAGNOSIS — N80.9 ENDOMETRIOSIS: Primary | ICD-10-CM

## 2024-03-08 DIAGNOSIS — R53.83 OTHER FATIGUE: ICD-10-CM

## 2024-03-08 DIAGNOSIS — R23.2 HOT FLASHES: ICD-10-CM

## 2024-03-08 RX ORDER — DROSPIRENONE AND ETHINYL ESTRADIOL 0.02-3(28)
1 KIT ORAL DAILY
Qty: 84 TABLET | Refills: 3 | Status: SHIPPED | OUTPATIENT
Start: 2024-03-08

## 2024-03-28 ENCOUNTER — APPOINTMENT (OUTPATIENT)
Dept: FAMILY MEDICINE | Age: 39
End: 2024-03-28

## 2024-03-28 PROCEDURE — 88341 IMHCHEM/IMCYTCHM EA ADD ANTB: CPT | Performed by: CLINICAL MEDICAL LABORATORY

## 2024-03-28 PROCEDURE — 88360 TUMOR IMMUNOHISTOCHEM/MANUAL: CPT | Performed by: CLINICAL MEDICAL LABORATORY

## 2024-03-28 PROCEDURE — 88305 TISSUE EXAM BY PATHOLOGIST: CPT | Performed by: CLINICAL MEDICAL LABORATORY

## 2024-03-28 PROCEDURE — 88342 IMHCHEM/IMCYTCHM 1ST ANTB: CPT | Performed by: CLINICAL MEDICAL LABORATORY

## 2024-03-29 ENCOUNTER — LAB REQUISITION (OUTPATIENT)
Dept: LAB | Age: 39
End: 2024-03-29

## 2024-03-29 DIAGNOSIS — D21.12 BENIGN NEOPLASM OF CONNECTIVE AND OTHER SOFT TISSUE OF LEFT UPPER LIMB, INCLUDING SHOULDER: ICD-10-CM

## 2024-04-02 LAB
ASR DISCLAIMER: NORMAL
CASE RPRT: NORMAL
CLINICAL INFO: NORMAL
PATH REPORT.FINAL DX SPEC: NORMAL
PATH REPORT.FINAL DX SPEC: NORMAL
PATH REPORT.GROSS SPEC: NORMAL

## 2024-04-29 ENCOUNTER — APPOINTMENT (OUTPATIENT)
Dept: URBAN - METROPOLITAN AREA CLINIC 316 | Age: 39
Setting detail: DERMATOLOGY
End: 2024-04-29

## 2024-04-29 DIAGNOSIS — L98.8 OTHER SPECIFIED DISORDERS OF THE SKIN AND SUBCUTANEOUS TISSUE: ICD-10-CM

## 2024-04-29 DIAGNOSIS — L81.4 OTHER MELANIN HYPERPIGMENTATION: ICD-10-CM

## 2024-04-29 DIAGNOSIS — D18.0 HEMANGIOMA: ICD-10-CM

## 2024-04-29 DIAGNOSIS — Z12.83 ENCOUNTER FOR SCREENING FOR MALIGNANT NEOPLASM OF SKIN: ICD-10-CM

## 2024-04-29 DIAGNOSIS — D22 MELANOCYTIC NEVI: ICD-10-CM

## 2024-04-29 PROBLEM — D18.01 HEMANGIOMA OF SKIN AND SUBCUTANEOUS TISSUE: Status: ACTIVE | Noted: 2024-04-29

## 2024-04-29 PROBLEM — D22.5 MELANOCYTIC NEVI OF TRUNK: Status: ACTIVE | Noted: 2024-04-29

## 2024-04-29 PROCEDURE — OTHER MIPS QUALITY: OTHER

## 2024-04-29 PROCEDURE — 99213 OFFICE O/P EST LOW 20 MIN: CPT

## 2024-04-29 PROCEDURE — OTHER PRESCRIPTION MEDICATION MANAGEMENT: OTHER

## 2024-04-29 PROCEDURE — OTHER COUNSELING: OTHER

## 2024-04-29 PROCEDURE — OTHER PRESCRIPTION: OTHER

## 2024-04-29 RX ORDER — TRETIONIN 0.25 MG/G
CREAM TOPICAL
Qty: 20 | Refills: 2 | Status: ERX | COMMUNITY
Start: 2024-04-29

## 2024-04-29 ASSESSMENT — LOCATION SIMPLE DESCRIPTION DERM
LOCATION SIMPLE: INFERIOR FOREHEAD
LOCATION SIMPLE: ABDOMEN
LOCATION SIMPLE: LEFT FOREHEAD
LOCATION SIMPLE: CHEST
LOCATION SIMPLE: UPPER BACK

## 2024-04-29 ASSESSMENT — LOCATION DETAILED DESCRIPTION DERM
LOCATION DETAILED: UPPER STERNUM
LOCATION DETAILED: LEFT INFERIOR MEDIAL FOREHEAD
LOCATION DETAILED: INFERIOR MID FOREHEAD
LOCATION DETAILED: SUPERIOR THORACIC SPINE
LOCATION DETAILED: EPIGASTRIC SKIN

## 2024-04-29 ASSESSMENT — LOCATION ZONE DERM
LOCATION ZONE: FACE
LOCATION ZONE: TRUNK

## 2024-04-29 NOTE — PROCEDURE: MIPS QUALITY
Quality 154 Part A: Falls: Risk Assessment (Should Be Reported With Measure 155.): Falls risk assessment not completed, reason not otherwise specified
Quality 431: Preventive Care And Screening: Unhealthy Alcohol Use - Screening: Patient not identified as an unhealthy alcohol user when screened for unhealthy alcohol use using a systematic screening method
Quality 226: Preventive Care And Screening: Tobacco Use: Screening And Cessation Intervention: Patient screened for tobacco use and is an ex/non-smoker
Quality 134: Screening For Clinical Depression And Follow-Up Plan: Depression Screening not documented, reason not given
Quality 205 Hiv/Aids: Sexually Transmitted Disease Screening For Chlamydia, Gonorrhea, And Syphilis: Chlamydia, gonorrhea, and syphilis screening results not documented as performed, reason not otherwise specified
Detail Level: Generalized

## 2024-04-29 NOTE — PROCEDURE: PRESCRIPTION MEDICATION MANAGEMENT
Initiate Treatment: tretinoin 0.025 % topical cream \\nQuantity: 20.0 g  Days Supply: 30\\nSig: Apply pea size amount to face every other night increase as tolerated to nightly
Render In Strict Bullet Format?: No
Detail Level: Zone

## 2024-05-10 ENCOUNTER — LAB SERVICES (OUTPATIENT)
Dept: LAB | Age: 39
End: 2024-05-10

## 2024-05-10 DIAGNOSIS — R23.2 HOT FLASHES: ICD-10-CM

## 2024-05-10 DIAGNOSIS — R53.83 OTHER FATIGUE: ICD-10-CM

## 2024-05-10 LAB — TSH SERPL-ACNC: 3.23 MCUNITS/ML (ref 0.35–5)

## 2024-05-10 PROCEDURE — 84443 ASSAY THYROID STIM HORMONE: CPT | Performed by: CLINICAL MEDICAL LABORATORY

## 2024-05-10 PROCEDURE — 36415 COLL VENOUS BLD VENIPUNCTURE: CPT | Performed by: OBSTETRICS & GYNECOLOGY

## 2024-05-22 ENCOUNTER — APPOINTMENT (OUTPATIENT)
Dept: OBGYN | Age: 39
End: 2024-05-22

## 2024-05-22 VITALS
RESPIRATION RATE: 14 BRPM | HEIGHT: 66 IN | WEIGHT: 138.45 LBS | SYSTOLIC BLOOD PRESSURE: 94 MMHG | DIASTOLIC BLOOD PRESSURE: 63 MMHG | BODY MASS INDEX: 22.25 KG/M2 | HEART RATE: 89 BPM

## 2024-05-22 DIAGNOSIS — N80.9 ENDOMETRIOSIS: Primary | ICD-10-CM

## 2024-05-22 PROCEDURE — 99213 OFFICE O/P EST LOW 20 MIN: CPT | Performed by: OBSTETRICS & GYNECOLOGY

## 2024-09-10 ENCOUNTER — E-ADVICE (OUTPATIENT)
Dept: OBGYN | Age: 39
End: 2024-09-10

## 2024-09-10 RX ORDER — NITROFURANTOIN 25; 75 MG/1; MG/1
100 CAPSULE ORAL 2 TIMES DAILY
Qty: 10 CAPSULE | Refills: 0 | Status: SHIPPED | OUTPATIENT
Start: 2024-09-10 | End: 2024-09-15

## 2024-11-26 ENCOUNTER — E-ADVICE (OUTPATIENT)
Dept: OBGYN | Age: 39
End: 2024-11-26

## 2024-11-26 ENCOUNTER — APPOINTMENT (OUTPATIENT)
Dept: OBGYN | Age: 39
End: 2024-11-26

## 2024-11-26 VITALS
RESPIRATION RATE: 14 BRPM | HEART RATE: 86 BPM | DIASTOLIC BLOOD PRESSURE: 58 MMHG | HEIGHT: 66 IN | SYSTOLIC BLOOD PRESSURE: 94 MMHG | BODY MASS INDEX: 21.72 KG/M2 | WEIGHT: 135.14 LBS

## 2024-11-26 DIAGNOSIS — N92.4 EXCESSIVE BLEEDING IN PREMENOPAUSAL PERIOD: Primary | ICD-10-CM

## 2024-11-26 PROCEDURE — 99213 OFFICE O/P EST LOW 20 MIN: CPT | Performed by: OBSTETRICS & GYNECOLOGY

## 2024-11-26 RX ORDER — TRANEXAMIC ACID 650 MG/1
1300 TABLET ORAL 3 TIMES DAILY
Qty: 30 TABLET | Refills: 3 | Status: SHIPPED | OUTPATIENT
Start: 2024-11-26

## 2024-11-27 ENCOUNTER — TELEPHONE (OUTPATIENT)
Dept: OBGYN | Age: 39
End: 2024-11-27

## 2025-04-30 ENCOUNTER — APPOINTMENT (OUTPATIENT)
Dept: URBAN - METROPOLITAN AREA CLINIC 316 | Age: 40
Setting detail: DERMATOLOGY
End: 2025-04-30

## 2025-04-30 DIAGNOSIS — L98.8 OTHER SPECIFIED DISORDERS OF THE SKIN AND SUBCUTANEOUS TISSUE: ICD-10-CM

## 2025-04-30 DIAGNOSIS — D49.2 NEOPLASM OF UNSPECIFIED BEHAVIOR OF BONE, SOFT TISSUE, AND SKIN: ICD-10-CM

## 2025-04-30 PROCEDURE — OTHER COUNSELING: OTHER

## 2025-04-30 PROCEDURE — OTHER ADDITIONAL NOTES: OTHER

## 2025-04-30 PROCEDURE — 11103 TANGNTL BX SKIN EA SEP/ADDL: CPT

## 2025-04-30 PROCEDURE — 11102 TANGNTL BX SKIN SINGLE LES: CPT

## 2025-04-30 PROCEDURE — OTHER PRESCRIPTION MEDICATION MANAGEMENT: OTHER

## 2025-04-30 PROCEDURE — OTHER MIPS QUALITY: OTHER

## 2025-04-30 PROCEDURE — OTHER PRESCRIPTION: OTHER

## 2025-04-30 PROCEDURE — 99213 OFFICE O/P EST LOW 20 MIN: CPT | Mod: 25

## 2025-04-30 PROCEDURE — OTHER BIOPSY BY SHAVE METHOD: OTHER

## 2025-04-30 RX ORDER — TRETIONIN 0.5 MG/G
CREAM TOPICAL
Qty: 45 | Refills: 3 | Status: ERX | COMMUNITY
Start: 2025-04-30

## 2025-04-30 ASSESSMENT — LOCATION SIMPLE DESCRIPTION DERM
LOCATION SIMPLE: CHEST
LOCATION SIMPLE: RIGHT UPPER BACK
LOCATION SIMPLE: LEFT FOREHEAD

## 2025-04-30 ASSESSMENT — LOCATION ZONE DERM
LOCATION ZONE: TRUNK
LOCATION ZONE: FACE

## 2025-04-30 ASSESSMENT — LOCATION DETAILED DESCRIPTION DERM
LOCATION DETAILED: LEFT INFERIOR MEDIAL FOREHEAD
LOCATION DETAILED: RIGHT MID-UPPER BACK
LOCATION DETAILED: LEFT LATERAL SUPERIOR CHEST

## 2025-04-30 NOTE — PROCEDURE: PRESCRIPTION MEDICATION MANAGEMENT
Initiate Treatment: tretinoin 0.05 % topical cream \\nQuantity: 45.0 g  Days Supply: 30\\nSig: Apply to face QHS 2-3 times a week increase as tolerated
Render In Strict Bullet Format?: No
Detail Level: Zone
Discontinue Regimen: tretinoin 0.025 % topical cream

## 2025-04-30 NOTE — PROCEDURE: MIPS QUALITY
Quality 226: Preventive Care And Screening: Tobacco Use: Screening And Cessation Intervention: Patient screened for tobacco use and is an ex/non-smoker
Quality 130: Documentation Of Current Medications In The Medical Record: Current Medications Documented
Quality 357: Surgical Site Infection (Ssi): No surgical site infection
Quality 47: Advance Care Plan: Advance care planning not documented, reason not otherwise specified.
Quality 394c: Hpv Vaccine For Adolescents: Patient did not have at least two HPV vaccines (with at least 146 days between the two) OR three HPV vaccines on or between the patient’s 9th and 13th birthdays.
Quality 134: Screening For Clinical Depression And Follow-Up Plan: The patient was screened for depression and the screen was negative and no follow up required
Quality 126: Diabetes Mellitus: Diabetic Foot And Ankle Care, Peripheral Neuropathy - Neurological Evaluation: Lower Extremity Neurological Exam not Performed
Quality 155: Falls Plan Of Care: Plan of Care not Documented, Reason not Otherwise Specified
Quality 143: Oncology: Medical And Radiation- Pain Intensity Quantified: Pain severity not assessed.
Quality 128: Preventive Care And Screening: Body Mass Index (Bmi) Screening And Follow-Up Plan: BMI is documented within normal parameters and no follow-up plan is required.
Quality 1: Diabetes Hemoglobin A1c Poor Control: Hemoglobin A1C was not recorded
Quality 205 Hiv/Aids: Sexually Transmitted Disease Screening For Chlamydia, Gonorrhea, And Syphilis: Chlamydia, gonorrhea, and syphilis screening results not documented as performed, reason not otherwise specified
Quality 387: Annual Hcv Screening For Patients Who Are Active Injection Drug Users: Screening for HCV infection not received within the 12 month reporting period, reason not given
Quality 155 Part B (Denominator): Falls: Risk Screening: No documentation of falls status
Quality 155 Part A: Falls: Risk Assessment: Falls risk assessment not completed, reason not otherwise specified
Quality 394a: Meningococcal Immunizations For Adolescents: Patient did not have one dose of meningococcal vaccine on or between the patient's 11th and 13th birthdays.
Quality 487: Screening For Social Drivers Of Health: Patient not screened for food insecurity, housing instability, transportation needs, utility difficulties, and interpersonal safety
Quality 394b: Td/Tdap Immunizations For Adolescents: Patient did not have one Tdap or one Td vaccine on or between the patient's 10th and 13th birthdays.
Quality 358: Patient-Centered Surgical Risk Assessment And Communication: A patient-specific risk assessment with a risk calculator was not completed or communicated to patient and/or family.
Quality 431: Preventive Care And Screening: Unhealthy Alcohol Use - Screening: Patient not identified as an unhealthy alcohol user when screened for unhealthy alcohol use using a systematic screening method
Quality 400a: One-Time Screening For Hepatitis C Virus (Hcv) And Treatment Initiation: Patient does not receive HCV antibody test OR patient does receive HCV antibody test but results not documented, reason not given
Quality 127: Diabetic Foot And Ankle Care, Ulcer Prevention - Evaluation Of Footwear: Footwear Evaluation not Performed
Detail Level: Generalized

## 2025-04-30 NOTE — PROCEDURE: ADDITIONAL NOTES
Render Risk Assessment In Note?: no
Additional Notes: Verbal consent obtained for biopsies.
Detail Level: Simple

## 2025-04-30 NOTE — PROCEDURE: BIOPSY BY SHAVE METHOD
Detail Level: Detailed
Depth Of Biopsy: dermis
Was A Bandage Applied: Yes
Size Of Lesion In Cm: 0
Biopsy Type: H and E
Biopsy Method: Dermablade
Anesthesia Type: 1% lidocaine with epinephrine and a 1:10 solution of 8.4% sodium bicarbonate
Anesthesia Volume In Cc: 0.5
Hemostasis: Drysol
Wound Care: Petrolatum
Dressing: bandage
Destruction After The Procedure: No
Type Of Destruction Used: Curettage
Curettage Text: The wound bed was treated with curettage after the biopsy was performed.
Cryotherapy Text: The wound bed was treated with cryotherapy after the biopsy was performed.
Electrodesiccation Text: The wound bed was treated with electrodesiccation after the biopsy was performed.
Electrodesiccation And Curettage Text: The wound bed was treated with electrodesiccation and curettage after the biopsy was performed.
Silver Nitrate Text: The wound bed was treated with silver nitrate after the biopsy was performed.
Lab: -[id number]
Medical Necessity Information: It is in your best interest to select a reason for this procedure from the list below. All of these items fulfill various CMS LCD requirements except the new and changing color options.
Consent: Written consent was obtained and risks were reviewed including but not limited to scarring, infection, bleeding, scabbing, incomplete removal, nerve damage and allergy to anesthesia.
Post-Care Instructions: I reviewed with the patient in detail post-care instructions. Patient is to keep the biopsy site dry overnight, and then apply bacitracin twice daily until healed. Patient may apply hydrogen peroxide soaks to remove any crusting.
Notification Instructions: Patient will be notified of biopsy results. However, patient instructed to call the office if not contacted within 2 weeks.
Billing Type: Third-Party Bill
Information: Selecting Yes will display possible errors in your note based on the variables you have selected. This validation is only offered as a suggestion for you. PLEASE NOTE THAT THE VALIDATION TEXT WILL BE REMOVED WHEN YOU FINALIZE YOUR NOTE. IF YOU WANT TO FAX A PRELIMINARY NOTE YOU WILL NEED TO TOGGLE THIS TO 'NO' IF YOU DO NOT WANT IT IN YOUR FAXED NOTE.

## 2025-07-22 ENCOUNTER — E-ADVICE (OUTPATIENT)
Dept: OBGYN | Age: 40
End: 2025-07-22

## 2025-07-30 ENCOUNTER — IMAGING SERVICES (OUTPATIENT)
Dept: MAMMOGRAPHY | Age: 40
End: 2025-07-30

## 2025-07-30 DIAGNOSIS — Z12.31 ENCOUNTER FOR SCREENING MAMMOGRAM FOR MALIGNANT NEOPLASM OF BREAST: ICD-10-CM

## 2025-09-18 ENCOUNTER — APPOINTMENT (OUTPATIENT)
Dept: OBGYN | Age: 40
End: 2025-09-18